# Patient Record
Sex: MALE | Race: WHITE | URBAN - METROPOLITAN AREA
[De-identification: names, ages, dates, MRNs, and addresses within clinical notes are randomized per-mention and may not be internally consistent; named-entity substitution may affect disease eponyms.]

---

## 2018-10-04 ENCOUNTER — OFFICE VISIT (OUTPATIENT)
Dept: PRIMARY CARE CLINIC | Age: 53
End: 2018-10-04
Payer: COMMERCIAL

## 2018-10-04 VITALS
BODY MASS INDEX: 28.56 KG/M2 | WEIGHT: 204 LBS | TEMPERATURE: 97.9 F | HEIGHT: 71 IN | SYSTOLIC BLOOD PRESSURE: 108 MMHG | DIASTOLIC BLOOD PRESSURE: 80 MMHG | OXYGEN SATURATION: 95 % | HEART RATE: 65 BPM

## 2018-10-04 DIAGNOSIS — L82.1 SEBORRHEIC KERATOSES: ICD-10-CM

## 2018-10-04 DIAGNOSIS — L85.9 HYPERKERATOSIS OF SKIN: Primary | ICD-10-CM

## 2018-10-04 DIAGNOSIS — R06.02 SHORTNESS OF BREATH: ICD-10-CM

## 2018-10-04 DIAGNOSIS — Z80.8 FAMILY HISTORY OF SKIN CANCER: ICD-10-CM

## 2018-10-04 DIAGNOSIS — M77.02 MEDIAL EPICONDYLITIS OF LEFT ELBOW: ICD-10-CM

## 2018-10-04 DIAGNOSIS — L72.0 EIC (EPIDERMAL INCLUSION CYST): ICD-10-CM

## 2018-10-04 DIAGNOSIS — Z12.11 SCREEN FOR COLON CANCER: ICD-10-CM

## 2018-10-04 DIAGNOSIS — J44.9 CHRONIC OBSTRUCTIVE PULMONARY DISEASE, UNSPECIFIED COPD TYPE (HCC): ICD-10-CM

## 2018-10-04 PROCEDURE — 17000 DESTRUCT PREMALG LESION: CPT | Performed by: FAMILY MEDICINE

## 2018-10-04 PROCEDURE — 17003 DESTRUCT PREMALG LES 2-14: CPT | Performed by: FAMILY MEDICINE

## 2018-10-04 PROCEDURE — 99213 OFFICE O/P EST LOW 20 MIN: CPT | Performed by: FAMILY MEDICINE

## 2018-10-04 RX ORDER — PREDNISONE 10 MG/1
TABLET ORAL
Qty: 20 TABLET | Refills: 0 | Status: SHIPPED | OUTPATIENT
Start: 2018-10-04 | End: 2018-10-14

## 2018-10-04 RX ORDER — ALBUTEROL SULFATE 90 UG/1
2 AEROSOL, METERED RESPIRATORY (INHALATION) EVERY 6 HOURS PRN
Qty: 1 INHALER | Refills: 3 | Status: ON HOLD | OUTPATIENT
Start: 2018-10-04 | End: 2020-05-11

## 2018-10-04 ASSESSMENT — PATIENT HEALTH QUESTIONNAIRE - PHQ9
SUM OF ALL RESPONSES TO PHQ9 QUESTIONS 1 & 2: 0
SUM OF ALL RESPONSES TO PHQ QUESTIONS 1-9: 0
2. FEELING DOWN, DEPRESSED OR HOPELESS: 0
1. LITTLE INTEREST OR PLEASURE IN DOING THINGS: 0
SUM OF ALL RESPONSES TO PHQ QUESTIONS 1-9: 0

## 2018-10-04 ASSESSMENT — ENCOUNTER SYMPTOMS
CHEST TIGHTNESS: 0
VOMITING: 0
CHOKING: 0
EYE PAIN: 0
SPUTUM PRODUCTION: 0
PHOTOPHOBIA: 0
FACIAL SWELLING: 0
NAUSEA: 0
EYE DISCHARGE: 0
CONSTIPATION: 0
SWOLLEN GLANDS: 0
HEMOPTYSIS: 0
ABDOMINAL PAIN: 0
RHINORRHEA: 0
COLOR CHANGE: 0
WHEEZING: 0
SORE THROAT: 0
EYE REDNESS: 0
COUGH: 0
APNEA: 0
STRIDOR: 0
ORTHOPNEA: 0
DIARRHEA: 0
NAIL CHANGES: 0
SHORTNESS OF BREATH: 1

## 2018-10-04 NOTE — PROGRESS NOTES
ANA Iglesias   in my presence, and it is both accurate and complete.  Electronically signed by: Aldo Rider DO    10/4/18 8:46 AM    Aldo Rider DO

## 2018-10-15 ENCOUNTER — OFFICE VISIT (OUTPATIENT)
Dept: PRIMARY CARE CLINIC | Age: 53
End: 2018-10-15
Payer: COMMERCIAL

## 2018-10-15 VITALS
BODY MASS INDEX: 28.84 KG/M2 | HEART RATE: 61 BPM | WEIGHT: 206 LBS | TEMPERATURE: 98.1 F | RESPIRATION RATE: 14 BRPM | HEIGHT: 71 IN | SYSTOLIC BLOOD PRESSURE: 112 MMHG | DIASTOLIC BLOOD PRESSURE: 78 MMHG

## 2018-10-15 DIAGNOSIS — R22.9 SINGLE SKIN NODULE: ICD-10-CM

## 2018-10-15 DIAGNOSIS — R52 LOCALIZED PAIN: ICD-10-CM

## 2018-10-15 DIAGNOSIS — R22.9 SINGLE SKIN NODULE: Primary | ICD-10-CM

## 2018-10-15 DIAGNOSIS — D23.9 DERMATOFIBROMA: ICD-10-CM

## 2018-10-15 PROCEDURE — 99212 OFFICE O/P EST SF 10 MIN: CPT | Performed by: FAMILY MEDICINE

## 2018-10-15 PROCEDURE — 11100 PR BIOPSY OF SKIN LESION: CPT | Performed by: FAMILY MEDICINE

## 2018-10-15 ASSESSMENT — ENCOUNTER SYMPTOMS
CHEST TIGHTNESS: 0
STRIDOR: 0
PHOTOPHOBIA: 0
EYE DISCHARGE: 0
CONSTIPATION: 0
EYE REDNESS: 0
FACIAL SWELLING: 0
NAUSEA: 0
COLOR CHANGE: 0
CHOKING: 0
WHEEZING: 0
APNEA: 0
EYE PAIN: 0
ABDOMINAL PAIN: 0
DIARRHEA: 0

## 2018-10-15 NOTE — PROGRESS NOTES
Musculoskeletal: Negative for gait problem, joint swelling, neck pain and neck stiffness. Skin: Negative for color change, pallor, rash and wound. Allergic/Immunologic: Negative for immunocompromised state. Neurological: Negative for tremors, syncope, facial asymmetry, speech difficulty and headaches. Psychiatric/Behavioral: Negative for confusion and hallucinations. The patient is not nervous/anxious and is not hyperactive. Objective:   /78 (Site: Right Upper Arm, Position: Sitting, Cuff Size: Medium Adult)   Pulse 61   Temp 98.1 °F (36.7 °C)   Resp 14   Ht 5' 11\" (1.803 m)   Wt 206 lb (93.4 kg)   BMI 28.73 kg/m²     Physical Exam   Constitutional: He is oriented to person, place, and time. He appears well-developed and well-nourished. HENT:   Head: Normocephalic and atraumatic. Right Ear: External ear normal.   Left Ear: External ear normal.   Eyes: Pupils are equal, round, and reactive to light. Conjunctivae and EOM are normal.   Neck: Normal range of motion. Neck supple. Neurological: He is alert and oriented to person, place, and time. Skin: Skin is warm and dry. Psychiatric: He has a normal mood and affect. His behavior is normal. Judgment and thought content normal.       Assessment:      Diagnosis Orders   1. Single skin nodule  Surgical Pathology    ID BIOPSY OF SKIN LESION   2. Localized pain  Surgical Pathology    ID BIOPSY OF SKIN LESION   3.  Probable Dermatofibroma  Surgical Pathology    ID BIOPSY OF SKIN LESION       Plan:      Orders Placed This Encounter   Procedures    Surgical Pathology     Standing Status:   Future     Standing Expiration Date:   10/15/2019     Order Specific Question:   PREVIOUS BIOPSY     Answer:   No     Order Specific Question:   PREOP DIAGNOSIS     Answer:   n/a     Order Specific Question:   FROZEN SECTION - NO OR YES/SPECIMEN     Answer:   No    ID BIOPSY OF SKIN LESION     No orders of the defined types were placed in this

## 2018-10-20 ENCOUNTER — OFFICE VISIT (OUTPATIENT)
Dept: FAMILY MEDICINE CLINIC | Age: 53
End: 2018-10-20
Payer: COMMERCIAL

## 2018-10-20 VITALS
DIASTOLIC BLOOD PRESSURE: 70 MMHG | SYSTOLIC BLOOD PRESSURE: 116 MMHG | BODY MASS INDEX: 28.98 KG/M2 | RESPIRATION RATE: 16 BRPM | TEMPERATURE: 97.9 F | HEIGHT: 71 IN | WEIGHT: 207 LBS

## 2018-10-20 DIAGNOSIS — L23.7 ALLERGIC CONTACT DERMATITIS DUE TO PLANTS, EXCEPT FOOD: Primary | ICD-10-CM

## 2018-10-20 PROCEDURE — 96372 THER/PROPH/DIAG INJ SC/IM: CPT | Performed by: NURSE PRACTITIONER

## 2018-10-20 PROCEDURE — 99212 OFFICE O/P EST SF 10 MIN: CPT | Performed by: NURSE PRACTITIONER

## 2018-10-20 RX ORDER — METHYLPREDNISOLONE ACETATE 40 MG/ML
40 INJECTION, SUSPENSION INTRA-ARTICULAR; INTRALESIONAL; INTRAMUSCULAR; SOFT TISSUE ONCE
Status: COMPLETED | OUTPATIENT
Start: 2018-10-20 | End: 2018-10-20

## 2018-10-20 RX ORDER — METHYLPREDNISOLONE 4 MG/1
TABLET ORAL
Qty: 1 KIT | Refills: 0 | Status: SHIPPED | OUTPATIENT
Start: 2018-10-20 | End: 2018-10-26

## 2018-10-20 RX ADMIN — METHYLPREDNISOLONE ACETATE 40 MG: 40 INJECTION, SUSPENSION INTRA-ARTICULAR; INTRALESIONAL; INTRAMUSCULAR; SOFT TISSUE at 12:40

## 2018-10-20 ASSESSMENT — ENCOUNTER SYMPTOMS
SORE THROAT: 0
GASTROINTESTINAL NEGATIVE: 1
WHEEZING: 0
TROUBLE SWALLOWING: 0
FACIAL SWELLING: 0
COLOR CHANGE: 0
EYE ITCHING: 0
SHORTNESS OF BREATH: 0
EYE DISCHARGE: 0
SINUS PRESSURE: 0
APNEA: 0
BACK PAIN: 0
COUGH: 0
STRIDOR: 0
CHOKING: 0
CHEST TIGHTNESS: 0

## 2018-10-20 NOTE — PROGRESS NOTES
cough, choking, chest tightness, shortness of breath, wheezing and stridor. Cardiovascular: Negative for chest pain, palpitations and leg swelling. Gastrointestinal: Negative. Endocrine: Negative for cold intolerance, heat intolerance, polydipsia and polyuria. Genitourinary: Negative for dysuria, flank pain, frequency, hematuria and urgency. Musculoskeletal: Negative for arthralgias, back pain, gait problem, joint swelling and myalgias. Skin: Positive for rash. Negative for color change. Neurological: Negative for dizziness, tremors, seizures, syncope, speech difficulty, weakness, numbness and headaches. Psychiatric/Behavioral: Negative for agitation, behavioral problems, confusion, decreased concentration, dysphoric mood and hallucinations. Objective:   /70   Temp 97.9 °F (36.6 °C)   Resp 16   Ht 5' 11\" (1.803 m)   Wt 207 lb (93.9 kg)   BMI 28.87 kg/m²     Physical Exam   Constitutional: He is oriented to person, place, and time. He appears well-developed. HENT:   Head: Normocephalic and atraumatic. Mouth/Throat: Oropharynx is clear and moist.   Eyes: Pupils are equal, round, and reactive to light. Neck: No tracheal deviation present. No thyromegaly present. Cardiovascular: Normal rate, regular rhythm and normal heart sounds. Exam reveals no gallop. No murmur heard. Pulmonary/Chest: Effort normal and breath sounds normal. He has no wheezes. He has no rales. He exhibits no tenderness. Abdominal: Soft. Bowel sounds are normal. He exhibits no distension and no mass. There is no tenderness. There is no rebound and no guarding. Musculoskeletal: Normal range of motion. He exhibits no edema or tenderness. Lymphadenopathy:     He has no cervical adenopathy. Neurological: He is alert and oriented to person, place, and time. Coordination normal.   Skin: Skin is warm and dry. Rash noted.  No abrasion, no bruising, no burn, no ecchymosis, no laceration and no petechiae

## 2018-10-26 ENCOUNTER — TELEPHONE (OUTPATIENT)
Dept: PRIMARY CARE CLINIC | Age: 53
End: 2018-10-26

## 2018-10-26 ENCOUNTER — OFFICE VISIT (OUTPATIENT)
Dept: FAMILY MEDICINE CLINIC | Age: 53
End: 2018-10-26
Payer: COMMERCIAL

## 2018-10-26 VITALS
DIASTOLIC BLOOD PRESSURE: 78 MMHG | WEIGHT: 207 LBS | HEART RATE: 67 BPM | HEIGHT: 71 IN | TEMPERATURE: 97.2 F | BODY MASS INDEX: 28.98 KG/M2 | OXYGEN SATURATION: 98 % | RESPIRATION RATE: 14 BRPM | SYSTOLIC BLOOD PRESSURE: 124 MMHG

## 2018-10-26 DIAGNOSIS — L24.7 CONTACT DERMATITIS AND ECZEMA DUE TO PLANT: Primary | ICD-10-CM

## 2018-10-26 PROCEDURE — 96372 THER/PROPH/DIAG INJ SC/IM: CPT | Performed by: NURSE PRACTITIONER

## 2018-10-26 PROCEDURE — 99213 OFFICE O/P EST LOW 20 MIN: CPT | Performed by: NURSE PRACTITIONER

## 2018-10-26 RX ORDER — METHYLPREDNISOLONE ACETATE 80 MG/ML
80 INJECTION, SUSPENSION INTRA-ARTICULAR; INTRALESIONAL; INTRAMUSCULAR; SOFT TISSUE ONCE
Status: COMPLETED | OUTPATIENT
Start: 2018-10-26 | End: 2018-10-26

## 2018-10-26 RX ORDER — BETAMETHASONE DIPROPIONATE 0.5 MG/G
CREAM TOPICAL
Qty: 1 TUBE | Refills: 0 | Status: SHIPPED | OUTPATIENT
Start: 2018-10-26 | End: 2018-11-09

## 2018-10-26 RX ORDER — CETIRIZINE HYDROCHLORIDE 10 MG/1
10 TABLET ORAL 2 TIMES DAILY
Qty: 10 TABLET | Refills: 0 | COMMUNITY
Start: 2018-10-26 | End: 2018-10-31

## 2018-10-26 RX ORDER — PREDNISONE 10 MG/1
TABLET ORAL
Qty: 20 TABLET | Refills: 0 | Status: SHIPPED | OUTPATIENT
Start: 2018-10-26 | End: 2018-11-08

## 2018-10-26 RX ORDER — RANITIDINE 150 MG/1
150 CAPSULE ORAL DAILY
Qty: 5 CAPSULE | Refills: 0 | COMMUNITY
Start: 2018-10-26 | End: 2019-02-08

## 2018-10-26 RX ADMIN — METHYLPREDNISOLONE ACETATE 80 MG: 80 INJECTION, SUSPENSION INTRA-ARTICULAR; INTRALESIONAL; INTRAMUSCULAR; SOFT TISSUE at 14:29

## 2018-10-26 ASSESSMENT — ENCOUNTER SYMPTOMS
FACIAL SWELLING: 0
EYE REDNESS: 0
EYE ITCHING: 0
TROUBLE SWALLOWING: 0
PHOTOPHOBIA: 0
EYE DISCHARGE: 0
SHORTNESS OF BREATH: 0
VOMITING: 0
SORE THROAT: 0
COUGH: 0
DIARRHEA: 0
EYE PAIN: 0
NAUSEA: 0
CHEST TIGHTNESS: 0
RHINORRHEA: 0

## 2018-10-26 NOTE — LETTER
[address]  [facility], [address]  [address] [place] [address]  Phone: [phone]  Fax: 226 HUONG Mckeon NP        October 26, 2018     Patient: William Rossi   YOB: 1965   Date of Visit: 10/26/2018       To Whom it May Concern:    Richelle Calderon was seen in my clinic on 10/26/2018. He may return to work on 10/28/18. If you have any questions or concerns, please don't hesitate to call.     Sincerely,         HUONG Moore NP

## 2018-10-26 NOTE — PATIENT INSTRUCTIONS
Oral Steroid Instructions: Take each dose with a small snack or meal to lessen potential GI upset. Follow dosing instructions provided with prescription. Common side effects include difficulty sleeping and irritability. Take full course as ordered.

## 2018-10-30 NOTE — TELEPHONE ENCOUNTER
Pt is aware. He is requesting to schedule a Saturday appt however, there are no Saturday appts available as of yet. He states there is still an issue with the skin lesion, it is raised and sore. He is concerned with additional costs of care needing to be seen again. Please Advise.

## 2018-11-01 ENCOUNTER — ANESTHESIA EVENT (OUTPATIENT)
Dept: ENDOSCOPY | Age: 53
End: 2018-11-01
Payer: COMMERCIAL

## 2018-11-01 ENCOUNTER — HOSPITAL ENCOUNTER (OUTPATIENT)
Age: 53
Setting detail: OUTPATIENT SURGERY
Discharge: HOME | End: 2018-11-01
Attending: SPECIALIST | Admitting: SPECIALIST
Payer: COMMERCIAL

## 2018-11-01 ENCOUNTER — ANESTHESIA (OUTPATIENT)
Dept: ENDOSCOPY | Age: 53
End: 2018-11-01
Payer: COMMERCIAL

## 2018-11-01 VITALS
SYSTOLIC BLOOD PRESSURE: 121 MMHG | DIASTOLIC BLOOD PRESSURE: 62 MMHG | OXYGEN SATURATION: 97 % | RESPIRATION RATE: 6 BRPM

## 2018-11-01 VITALS
OXYGEN SATURATION: 98 % | DIASTOLIC BLOOD PRESSURE: 81 MMHG | HEART RATE: 51 BPM | RESPIRATION RATE: 16 BRPM | SYSTOLIC BLOOD PRESSURE: 132 MMHG

## 2018-11-01 PROCEDURE — 2580000003 HC RX 258: Performed by: SPECIALIST

## 2018-11-01 PROCEDURE — 3700000000 HC ANESTHESIA ATTENDED CARE: Performed by: SPECIALIST

## 2018-11-01 PROCEDURE — 2500000003 HC RX 250 WO HCPCS: Performed by: NURSE ANESTHETIST, CERTIFIED REGISTERED

## 2018-11-01 PROCEDURE — 7100000010 HC PHASE II RECOVERY - FIRST 15 MIN: Performed by: SPECIALIST

## 2018-11-01 PROCEDURE — 3609027000 HC COLONOSCOPY: Performed by: SPECIALIST

## 2018-11-01 PROCEDURE — 6360000002 HC RX W HCPCS: Performed by: NURSE ANESTHETIST, CERTIFIED REGISTERED

## 2018-11-01 RX ORDER — SODIUM CHLORIDE 0.9 % (FLUSH) 0.9 %
10 SYRINGE (ML) INJECTION PRN
Status: DISCONTINUED | OUTPATIENT
Start: 2018-11-01 | End: 2018-11-01 | Stop reason: HOSPADM

## 2018-11-01 RX ORDER — LIDOCAINE HYDROCHLORIDE 20 MG/ML
INJECTION, SOLUTION INFILTRATION; PERINEURAL PRN
Status: DISCONTINUED | OUTPATIENT
Start: 2018-11-01 | End: 2018-11-01 | Stop reason: SDUPTHER

## 2018-11-01 RX ORDER — PROPOFOL 10 MG/ML
INJECTION, EMULSION INTRAVENOUS PRN
Status: DISCONTINUED | OUTPATIENT
Start: 2018-11-01 | End: 2018-11-01 | Stop reason: SDUPTHER

## 2018-11-01 RX ORDER — SODIUM CHLORIDE 0.9 % (FLUSH) 0.9 %
10 SYRINGE (ML) INJECTION EVERY 12 HOURS SCHEDULED
Status: DISCONTINUED | OUTPATIENT
Start: 2018-11-01 | End: 2018-11-01 | Stop reason: HOSPADM

## 2018-11-01 RX ORDER — LIDOCAINE HYDROCHLORIDE 10 MG/ML
1 INJECTION, SOLUTION EPIDURAL; INFILTRATION; INTRACAUDAL; PERINEURAL
Status: DISCONTINUED | OUTPATIENT
Start: 2018-11-01 | End: 2018-11-01 | Stop reason: HOSPADM

## 2018-11-01 RX ORDER — SODIUM CHLORIDE 9 MG/ML
INJECTION, SOLUTION INTRAVENOUS CONTINUOUS
Status: DISCONTINUED | OUTPATIENT
Start: 2018-11-01 | End: 2018-11-01 | Stop reason: HOSPADM

## 2018-11-01 RX ADMIN — LIDOCAINE HYDROCHLORIDE 40 MG: 20 INJECTION, SOLUTION INFILTRATION; PERINEURAL at 07:45

## 2018-11-01 RX ADMIN — SODIUM CHLORIDE: 9 INJECTION, SOLUTION INTRAVENOUS at 07:00

## 2018-11-01 RX ADMIN — PROPOFOL 20 MG: 10 INJECTION, EMULSION INTRAVENOUS at 07:49

## 2018-11-01 RX ADMIN — PROPOFOL 20 MG: 10 INJECTION, EMULSION INTRAVENOUS at 07:47

## 2018-11-01 RX ADMIN — PROPOFOL 20 MG: 10 INJECTION, EMULSION INTRAVENOUS at 07:46

## 2018-11-01 RX ADMIN — PROPOFOL 20 MG: 10 INJECTION, EMULSION INTRAVENOUS at 07:53

## 2018-11-01 RX ADMIN — PROPOFOL 20 MG: 10 INJECTION, EMULSION INTRAVENOUS at 07:51

## 2018-11-01 RX ADMIN — PROPOFOL 100 MG: 10 INJECTION, EMULSION INTRAVENOUS at 07:45

## 2018-11-01 ASSESSMENT — ENCOUNTER SYMPTOMS: SHORTNESS OF BREATH: 1

## 2018-11-01 ASSESSMENT — PAIN DESCRIPTION - DESCRIPTORS: DESCRIPTORS: BURNING

## 2018-11-01 ASSESSMENT — PAIN DESCRIPTION - LOCATION: LOCATION: RECTUM

## 2018-11-01 ASSESSMENT — PAIN SCALES - GENERAL: PAINLEVEL_OUTOF10: 3

## 2018-11-01 ASSESSMENT — PAIN DESCRIPTION - PAIN TYPE: TYPE: ACUTE PAIN

## 2018-11-01 NOTE — ANESTHESIA PRE PROCEDURE
RN M77.02    Lumbago M54.5       Past Medical History:        Diagnosis Date    Atypical chest pain, Dr Michelle Pinto, neg GXT, 2014 10/7/2015    [facility] obstructive [facility]     [facility] lung ([facility]     Health maintenance examination 10/7/2015    Left nephrolithiasis 10/7/2015    Lumbago 4/27/2004    Medial epicondylitis of left elbow, Drummer, RN 10/4/2018    Shortness of breath 10/7/2015       Past Surgical History:        Procedure Laterality Date    AMPUTATION Left     ring finger tip    WISDOM TOOTH EXTRACTION      x4       Social History:    Social History   Substance Use Topics    Smoking status: Never Smoker    Smokeless tobacco: Never Used    Alcohol use No                                Counseling given: Not Answered      Vital Signs (Current): There were no vitals filed for this visit.                                            BP Readings from Last 3 Encounters:   10/26/18 124/78   10/20/18 116/70   10/15/18 112/78       NPO Status: Time of last liquid consumption: 2300                        Time of last solid consumption: 1200                        Date of last liquid consumption: 10/31/18                        Date of last solid food consumption: 10/30/18    BMI:   Wt Readings from Last 3 Encounters:   10/26/18 207 lb (93.9 kg)   10/20/18 207 lb (93.9 kg)   10/15/18 206 lb (93.4 kg)     There is no height or weight on file to calculate BMI.    CBC:   Lab Results   Component Value Date    WBC 6.4 09/10/2014    RBC 5.01 09/10/2014    HGB 15.8 09/10/2014    HCT 48.1 09/10/2014    MCV 96.1 09/10/2014    RDW 13.1 09/10/2014     09/10/2014       CMP:   Lab Results   Component Value Date     06/24/2016    K 3.8 06/24/2016     06/24/2016    CO2 27 06/24/2016    BUN 16 06/24/2016    CREATININE 0.65 06/24/2016    GFRAA >60.0 06/24/2016    LABGLOM >60.0 06/24/2016    GLUCOSE 71 06/24/2016    PROT 7.4 06/24/2016    CALCIUM 9.3 06/24/2016    BILITOT 0.6

## 2019-02-08 ENCOUNTER — OFFICE VISIT (OUTPATIENT)
Dept: PRIMARY CARE CLINIC | Age: 54
End: 2019-02-08
Payer: COMMERCIAL

## 2019-02-08 VITALS
OXYGEN SATURATION: 99 % | DIASTOLIC BLOOD PRESSURE: 72 MMHG | TEMPERATURE: 95.8 F | SYSTOLIC BLOOD PRESSURE: 100 MMHG | WEIGHT: 214 LBS | RESPIRATION RATE: 16 BRPM | HEART RATE: 66 BPM | BODY MASS INDEX: 29.96 KG/M2 | HEIGHT: 71 IN

## 2019-02-08 DIAGNOSIS — L82.1 SEBORRHEIC KERATOSIS: Primary | ICD-10-CM

## 2019-02-08 DIAGNOSIS — M77.02 MEDIAL EPICONDYLITIS OF LEFT ELBOW: ICD-10-CM

## 2019-02-08 PROCEDURE — 17000 DESTRUCT PREMALG LESION: CPT | Performed by: FAMILY MEDICINE

## 2019-02-08 PROCEDURE — 20605 DRAIN/INJ JOINT/BURSA W/O US: CPT | Performed by: FAMILY MEDICINE

## 2019-02-08 PROCEDURE — 99213 OFFICE O/P EST LOW 20 MIN: CPT | Performed by: FAMILY MEDICINE

## 2019-02-08 RX ORDER — TRIAMCINOLONE ACETONIDE 40 MG/ML
80 INJECTION, SUSPENSION INTRA-ARTICULAR; INTRAMUSCULAR ONCE
Status: COMPLETED | OUTPATIENT
Start: 2019-02-08 | End: 2019-02-08

## 2019-02-08 RX ADMIN — TRIAMCINOLONE ACETONIDE 80 MG: 40 INJECTION, SUSPENSION INTRA-ARTICULAR; INTRAMUSCULAR at 10:12

## 2019-02-08 ASSESSMENT — ENCOUNTER SYMPTOMS
EYE REDNESS: 0
NAUSEA: 0
FACIAL SWELLING: 0
COLOR CHANGE: 1
STRIDOR: 0
WHEEZING: 0
CHOKING: 0
DIARRHEA: 0
ABDOMINAL PAIN: 0
PHOTOPHOBIA: 0
CONSTIPATION: 0
EYE PAIN: 0
EYE DISCHARGE: 0
CHEST TIGHTNESS: 0
APNEA: 0

## 2019-02-08 ASSESSMENT — PATIENT HEALTH QUESTIONNAIRE - PHQ9
2. FEELING DOWN, DEPRESSED OR HOPELESS: 0
SUM OF ALL RESPONSES TO PHQ9 QUESTIONS 1 & 2: 0
1. LITTLE INTEREST OR PLEASURE IN DOING THINGS: 0
SUM OF ALL RESPONSES TO PHQ QUESTIONS 1-9: 0
SUM OF ALL RESPONSES TO PHQ QUESTIONS 1-9: 0

## 2019-02-18 ENCOUNTER — OFFICE VISIT (OUTPATIENT)
Dept: PRIMARY CARE CLINIC | Age: 54
End: 2019-02-18
Payer: COMMERCIAL

## 2019-02-18 VITALS
BODY MASS INDEX: 29.54 KG/M2 | TEMPERATURE: 97.6 F | WEIGHT: 211 LBS | RESPIRATION RATE: 14 BRPM | OXYGEN SATURATION: 98 % | HEIGHT: 71 IN | HEART RATE: 65 BPM | DIASTOLIC BLOOD PRESSURE: 69 MMHG | SYSTOLIC BLOOD PRESSURE: 100 MMHG

## 2019-02-18 DIAGNOSIS — J02.9 SORE THROAT: Primary | ICD-10-CM

## 2019-02-18 LAB — S PYO AG THROAT QL: NORMAL

## 2019-02-18 PROCEDURE — 87880 STREP A ASSAY W/OPTIC: CPT | Performed by: INTERNAL MEDICINE

## 2019-02-18 PROCEDURE — 99214 OFFICE O/P EST MOD 30 MIN: CPT | Performed by: INTERNAL MEDICINE

## 2019-02-18 RX ORDER — AZITHROMYCIN 250 MG/1
TABLET, FILM COATED ORAL
Qty: 1 PACKET | Refills: 1 | Status: SHIPPED | OUTPATIENT
Start: 2019-02-18 | End: 2019-03-08

## 2019-02-21 ASSESSMENT — ENCOUNTER SYMPTOMS
PHOTOPHOBIA: 0
CHOKING: 0
NAUSEA: 0
VOICE CHANGE: 0
TROUBLE SWALLOWING: 0
ABDOMINAL PAIN: 0
VOMITING: 0
SHORTNESS OF BREATH: 0

## 2019-03-08 ENCOUNTER — OFFICE VISIT (OUTPATIENT)
Dept: INTERNAL MEDICINE CLINIC | Age: 54
End: 2019-03-08
Payer: COMMERCIAL

## 2019-03-08 VITALS
RESPIRATION RATE: 14 BRPM | BODY MASS INDEX: 28.56 KG/M2 | DIASTOLIC BLOOD PRESSURE: 84 MMHG | HEIGHT: 71 IN | TEMPERATURE: 97.3 F | WEIGHT: 204 LBS | SYSTOLIC BLOOD PRESSURE: 120 MMHG

## 2019-03-08 DIAGNOSIS — J20.9 ACUTE BRONCHITIS, UNSPECIFIED ORGANISM: Primary | ICD-10-CM

## 2019-03-08 DIAGNOSIS — J10.1 INFLUENZA A: ICD-10-CM

## 2019-03-08 LAB
INFLUENZA A ANTIBODY: POSITIVE
INFLUENZA B ANTIBODY: NEGATIVE

## 2019-03-08 PROCEDURE — 99213 OFFICE O/P EST LOW 20 MIN: CPT | Performed by: FAMILY MEDICINE

## 2019-03-08 PROCEDURE — 87804 INFLUENZA ASSAY W/OPTIC: CPT | Performed by: FAMILY MEDICINE

## 2019-03-08 RX ORDER — AZITHROMYCIN 250 MG/1
250 TABLET, FILM COATED ORAL SEE ADMIN INSTRUCTIONS
Qty: 6 TABLET | Refills: 0 | Status: SHIPPED | OUTPATIENT
Start: 2019-03-08 | End: 2019-03-13

## 2019-03-08 RX ORDER — OSELTAMIVIR PHOSPHATE 75 MG/1
75 CAPSULE ORAL 2 TIMES DAILY
Qty: 10 CAPSULE | Refills: 0 | Status: SHIPPED | OUTPATIENT
Start: 2019-03-08 | End: 2019-03-13

## 2019-03-08 RX ORDER — METHYLPREDNISOLONE 4 MG/1
TABLET ORAL
Qty: 1 KIT | Refills: 0 | Status: SHIPPED | OUTPATIENT
Start: 2019-03-08 | End: 2019-03-14

## 2019-03-11 ENCOUNTER — OFFICE VISIT (OUTPATIENT)
Dept: INTERNAL MEDICINE CLINIC | Age: 54
End: 2019-03-11
Payer: COMMERCIAL

## 2019-03-11 ENCOUNTER — HOSPITAL ENCOUNTER (OUTPATIENT)
Dept: GENERAL RADIOLOGY | Age: 54
Discharge: HOME | End: 2019-03-13
Payer: COMMERCIAL

## 2019-03-11 VITALS
HEART RATE: 65 BPM | TEMPERATURE: 101.2 F | HEIGHT: 71 IN | SYSTOLIC BLOOD PRESSURE: 124 MMHG | DIASTOLIC BLOOD PRESSURE: 88 MMHG | BODY MASS INDEX: 28.56 KG/M2 | WEIGHT: 204 LBS | OXYGEN SATURATION: 98 %

## 2019-03-11 DIAGNOSIS — J10.1 INFLUENZA A: ICD-10-CM

## 2019-03-11 DIAGNOSIS — R06.02 SHORTNESS OF BREATH: ICD-10-CM

## 2019-03-11 DIAGNOSIS — R06.02 SHORTNESS OF BREATH: Primary | ICD-10-CM

## 2019-03-11 PROCEDURE — 99213 OFFICE O/P EST LOW 20 MIN: CPT | Performed by: FAMILY MEDICINE

## 2019-03-11 PROCEDURE — 71046 X-RAY EXAM CHEST 2 VIEWS: CPT

## 2019-03-12 ENCOUNTER — TELEPHONE (OUTPATIENT)
Dept: PRIMARY CARE CLINIC | Age: 54
End: 2019-03-12

## 2019-03-12 ENCOUNTER — TELEPHONE (OUTPATIENT)
Dept: ENDOCRINOLOGY | Age: 54
End: 2019-03-12

## 2019-04-19 ENCOUNTER — TELEPHONE (OUTPATIENT)
Dept: INTERNAL MEDICINE CLINIC | Age: 54
End: 2019-04-19

## 2019-05-14 ENCOUNTER — OFFICE VISIT (OUTPATIENT)
Dept: FAMILY MEDICINE CLINIC | Age: 54
End: 2019-05-14
Payer: COMMERCIAL

## 2019-05-14 VITALS
TEMPERATURE: 97.7 F | WEIGHT: 211 LBS | HEIGHT: 71 IN | SYSTOLIC BLOOD PRESSURE: 122 MMHG | OXYGEN SATURATION: 98 % | BODY MASS INDEX: 29.54 KG/M2 | DIASTOLIC BLOOD PRESSURE: 80 MMHG | HEART RATE: 67 BPM

## 2019-05-14 DIAGNOSIS — L23.7 POISON IVY: Primary | ICD-10-CM

## 2019-05-14 PROCEDURE — 99213 OFFICE O/P EST LOW 20 MIN: CPT | Performed by: NURSE PRACTITIONER

## 2019-05-14 RX ORDER — TRIAMCINOLONE ACETONIDE 40 MG/ML
40 INJECTION, SUSPENSION INTRA-ARTICULAR; INTRAMUSCULAR ONCE
Status: DISCONTINUED | OUTPATIENT
Start: 2019-05-14 | End: 2022-02-01

## 2019-05-14 RX ORDER — TRIAMCINOLONE ACETONIDE 40 MG/ML
80 INJECTION, SUSPENSION INTRA-ARTICULAR; INTRAMUSCULAR ONCE
Status: DISCONTINUED | OUTPATIENT
Start: 2019-05-14 | End: 2020-12-14

## 2019-05-14 RX ORDER — PREDNISONE 10 MG/1
TABLET ORAL
Qty: 40 TABLET | Refills: 0 | Status: SHIPPED | OUTPATIENT
Start: 2019-05-14 | End: 2019-05-23 | Stop reason: ALTCHOICE

## 2019-05-14 NOTE — PATIENT INSTRUCTIONS
Patient Education        Poison [place], [place], and [place]: Care Instructions  Your Care Instructions    Poison ivy, poison oak, and poison sumac are plants that can cause a skin rash upon contact. The red, itchy rash often shows up in lines or streaks and may cause fluid-filled blisters or large, raised hives. The rash is caused by an allergic reaction to an oil in poison ivy, oak, and sumac. The rash may occur when you touch the plant or when you touch clothing, pet fur, sporting gear, gardening tools, or other objects that have come in contact with one of these plants. You cannot catch or spread the rash, even if you touch it or the blister fluid, because the plant oil will already have been absorbed or washed off the skin. The rash may seem to be spreading, but either it is still developing from earlier contact or you have touched something that still has the plant oil on it. Follow-up care is a key part of your treatment and safety. Be sure to make and go to all appointments, and call your doctor if you are having problems. It's also a good idea to know your test results and keep a list of the medicines you take. How can you care for yourself at home? · If your doctor prescribed a cream, use it as directed. If your doctor prescribed medicine, take it exactly as prescribed. Call your doctor if you think you are having a problem with your medicine. · Use cold, wet cloths to reduce itching. · Keep cool, and stay out of the sun. · Leave the rash open to the air. · Wash all clothing or other things that may have come in contact with the plant oil. · Avoid most lotions and ointments until the rash heals. Calamine lotion may help relieve symptoms of a plant rash. Use it 3 or 4 times a day. To prevent poison ivy exposure  If you know that you will be near poison ivy, oak, or sumac, you can try these options:  · Use a product designed to help prevent plant oil from getting on the skin.  These products, such as Ivy X Pre-Contact Skin Solution, come in lotions, sprays, or towelettes. You put the product on your skin right before you go outdoors. · If you did not use a preventive product and you have had contact with plant oil, clean it off your skin as soon as possible. Use a product such as [employer]ec[employer] Original Outdoor Skin Cleanser. These products can also be used to clean plant oil from clothing or tools. When should you call for help? Call your doctor now or seek immediate medical care if:    · Your rash gets worse, and you start to feel bad and have a fever, a stiff neck, nausea, and vomiting.     · You have signs of infection, such as:  ? Increased pain, swelling, warmth, or redness. ? Red streaks leading from the rash. ? Pus draining from the rash. ? A fever.    Watch closely for changes in your health, and be sure to contact your doctor if:    · You have new blisters or bruises, or the rash spreads and looks like a sunburn.     · The rash gets worse, or it comes back after nearly disappearing.     · You think a medicine you are using is making your rash worse.     · Your rash does not clear up after 1 to 2 weeks of home treatment.     · You have joint aches or body aches with your rash. Where can you learn more? Go to https://iCatapult.[employer]. org and sign in to your [employer] account. Enter T424 in the [facility] box to learn more about \"Poison [place], [place], and [place]: Care Instructions. \"     If you do not have an account, please click on the \"Sign Up Now\" link. Current as of: April 17, 2018  Content Version: 12.0  © 8062-1864 [employer]. Care instructions adapted under license by [facility] [employer] [facility] ([facility]). If you have questions about a medical condition or this instruction, always ask your healthcare professional. [address] any warranty or liability for your use of this information.

## 2019-05-23 ENCOUNTER — OFFICE VISIT (OUTPATIENT)
Dept: FAMILY MEDICINE CLINIC | Age: 54
End: 2019-05-23
Payer: COMMERCIAL

## 2019-05-23 VITALS
BODY MASS INDEX: 29.89 KG/M2 | HEART RATE: 74 BPM | TEMPERATURE: 97.7 F | DIASTOLIC BLOOD PRESSURE: 74 MMHG | OXYGEN SATURATION: 98 % | RESPIRATION RATE: 12 BRPM | HEIGHT: 70 IN | WEIGHT: 208.8 LBS | SYSTOLIC BLOOD PRESSURE: 104 MMHG

## 2019-05-23 DIAGNOSIS — R21 RASH: Primary | ICD-10-CM

## 2019-05-23 DIAGNOSIS — R07.9 CHEST PAIN, UNSPECIFIED TYPE: ICD-10-CM

## 2019-05-23 DIAGNOSIS — R21 RASH: ICD-10-CM

## 2019-05-23 LAB
ANION GAP SERPL CALCULATED.3IONS-SCNC: 14 MEQ/L (ref 9–15)
BASOPHILS ABSOLUTE: 0 K/UL (ref 0–0.2)
BASOPHILS RELATIVE PERCENT: 0.3 %
BUN BLDV-MCNC: 19 MG/DL (ref 6–20)
CALCIUM SERPL-MCNC: 9.4 MG/DL (ref 8.5–9.9)
CHLORIDE BLD-SCNC: 103 MEQ/L (ref 95–107)
CHOLESTEROL, TOTAL: 188 MG/DL (ref 0–199)
CO2: 27 MEQ/L (ref 20–31)
CREAT SERPL-MCNC: 0.82 MG/DL (ref 0.7–1.2)
EOSINOPHILS ABSOLUTE: 0.1 K/UL (ref 0–0.7)
EOSINOPHILS RELATIVE PERCENT: 0.9 %
GFR AFRICAN AMERICAN: >60
GFR NON-AFRICAN AMERICAN: >60
GLUCOSE BLD-MCNC: 93 MG/DL (ref 70–99)
HCT VFR BLD CALC: 47 % (ref 42–52)
HDLC SERPL-MCNC: 35 MG/DL (ref 40–59)
HEMOGLOBIN: 16.3 G/DL (ref 14–18)
LDL CHOLESTEROL CALCULATED: 113 MG/DL (ref 0–129)
LYMPHOCYTES ABSOLUTE: 1.1 K/UL (ref 1–4.8)
LYMPHOCYTES RELATIVE PERCENT: 19.8 %
MCH RBC QN AUTO: 33.4 PG (ref 27–31.3)
MCHC RBC AUTO-ENTMCNC: 34.6 % (ref 33–37)
MCV RBC AUTO: 96.4 FL (ref 80–100)
MONOCYTES ABSOLUTE: 0.5 K/UL (ref 0.2–0.8)
MONOCYTES RELATIVE PERCENT: 8.6 %
NEUTROPHILS ABSOLUTE: 3.8 K/UL (ref 1.4–6.5)
NEUTROPHILS RELATIVE PERCENT: 70.4 %
PDW BLD-RTO: 13.7 % (ref 11.5–14.5)
PLATELET # BLD: 132 K/UL (ref 130–400)
POTASSIUM SERPL-SCNC: 4 MEQ/L (ref 3.4–4.9)
RBC # BLD: 4.88 M/UL (ref 4.7–6.1)
SODIUM BLD-SCNC: 144 MEQ/L (ref 135–144)
TRIGL SERPL-MCNC: 198 MG/DL (ref 0–150)
WBC # BLD: 5.4 K/UL (ref 4.8–10.8)

## 2019-05-23 PROCEDURE — 99204 OFFICE O/P NEW MOD 45 MIN: CPT | Performed by: INTERNAL MEDICINE

## 2019-05-23 ASSESSMENT — ENCOUNTER SYMPTOMS
EYE ITCHING: 0
EYE DISCHARGE: 0
SORE THROAT: 0
TROUBLE SWALLOWING: 0
EYE REDNESS: 0
RHINORRHEA: 0
APNEA: 0
FACIAL SWELLING: 0
SINUS PRESSURE: 0
VOICE CHANGE: 0
BACK PAIN: 0
EYE PAIN: 0
DIARRHEA: 0
WHEEZING: 0
CONSTIPATION: 0
COLOR CHANGE: 0
CHEST TIGHTNESS: 0
SINUS PAIN: 0
ABDOMINAL DISTENTION: 0
BLOOD IN STOOL: 0
NAUSEA: 0
SHORTNESS OF BREATH: 0
PHOTOPHOBIA: 0
COUGH: 0
VOMITING: 0
ABDOMINAL PAIN: 0
RECTAL PAIN: 0

## 2019-05-23 NOTE — PROGRESS NOTES
Subjective:      Patient ID: Kelsea Ludwig is a 48 y.o. male who presents today for:  Chief Complaint   Patient presents with    Annual Exam     Pt presents here for a physical and Blood work.  Establish Care     Pt is establishing care, Last Pcp - . Pt states that he has skin lesions that are appearing and would like to discuss treatmeant, Pt states that his family has a Hx of skin cancer.  Chest Pain     Pt is complaining of chest pain not due to cardiac issue just pain on the left side medial stinal of his chest.        HPI  72-year-old male with a history of COPD presents to establish continuity with me as his new primary care doctor Mr. Pooja Santos states that he is presently applying topical steroids as treatment for poison ivy. The rash associated with this is improving. He denies fevers chills weight loss nausea night sweats or distal constitutional symptoms . Voices concern over several small skin lesions that are on his back and chest and arms. These lesions were previously evaluated by his primary care doctor and have not in the past been demonstrated to be malignant. He also reports left precordial chest pain that is not associated with exertion shortness of breath, palpitations, nausea, diaphoresis wheezing, cough or additional cardiopulmonary symptoms. He reports having a negative stress test last year. He does not encase and nicotine abuse. He does not have a history  of premature cardiovascular disease.         Past Medical History:   Diagnosis Date    Atypical chest pain, Dr aSmaria Mistry, neg GXT, 2014 10/7/2015    [facility]     Emphysema of lung ([facility]     Health maintenance examination 10/7/2015    Left nephrolithiasis 10/7/2015    Lumbago 4/27/2004    Medial epicondylitis of left elbow, , RN 10/4/2018    Shortness of breath 10/7/2015     Past Surgical History:   Procedure Laterality Date    AMPUTATION Left     ring finger tip    OR COLON CA SCRN NOT HI RSK IND N/A 11/1/2018    COLONOSCOPY performed by Enrique Osman MD at [facility] EXTRACTION      x4     Social History     Socioeconomic History    Marital status: Single     Spouse name: Not on file    Number of children: Not on file    Years of education: Not on file    Highest education level: Not on file   Occupational History    Not on file   Social Needs    Financial resource strain: Not on file    Food insecurity:     Worry: Not on file     Inability: Not on file    Transportation needs:     Medical: Not on file     Non-medical: Not on file   Tobacco Use    Smoking status: Never Smoker    Smokeless tobacco: Never Used   Substance and Sexual Activity    Alcohol use: No    Drug use: No    Sexual activity: Never   Lifestyle    Physical activity:     Days per week: Not on file     Minutes per session: Not on file    Stress: Not on file   Relationships    Social connections:     Talks on phone: Not on file     Gets together: Not on file     Attends Congregational service: Not on file     Active member of club or organization: Not on file     Attends meetings of clubs or organizations: Not on file     Relationship status: Not on file    Intimate partner violence:     Fear of current or ex partner: Not on file     Emotionally abused: Not on file     Physically abused: Not on file     Forced sexual activity: Not on file   Other Topics Concern    Not on file   Social History Narrative    Not on file     Family History   Problem Relation Age of Onset    Cancer Mother     Heart Disease Mother      Allergies   Allergen Reactions    Bee Venom      Other reaction(s): Unknown     Current Outpatient Medications on File Prior to Visit   Medication Sig Dispense Refill    albuterol sulfate HFA (PROAIR HFA) 108 (90 Base) MCG/ACT inhaler Inhale 2 puffs into the lungs every 6 hours as needed for Wheezing 1 Inhaler 3     Current Facility-Administered Medications on File Prior to Visit   Medication Dose Route Frequency Provider Last Rate Last Dose    triamcinolone acetonide (KENALOG-40) injection 80 mg  80 mg Intramuscular Once [employer], APRN - CNP        triamcinolone acetonide (KENALOG-40) injection 40 mg  40 mg Intramuscular Once [employer], APRN - CNP                Review of Systems   Constitutional: Negative for chills, diaphoresis, fatigue and fever. HENT: Negative for congestion, dental problem, drooling, ear discharge, ear pain, facial swelling, hearing loss, mouth sores, nosebleeds, postnasal drip, rhinorrhea, sinus pressure, sinus pain, sneezing, sore throat, tinnitus, trouble swallowing and voice change. Eyes: Negative for photophobia, pain, discharge, redness, itching and visual disturbance. Respiratory: Negative for apnea, cough, chest tightness, shortness of breath and wheezing. Cardiovascular: Negative for chest pain, palpitations and leg swelling. Gastrointestinal: Negative for abdominal distention, abdominal pain, blood in stool, constipation, diarrhea, nausea, rectal pain and vomiting. Endocrine: Negative for cold intolerance, heat intolerance, polydipsia, polyphagia and polyuria. Genitourinary: Negative for decreased urine volume, difficulty urinating, dysuria, flank pain, frequency, genital sores, hematuria and urgency. Musculoskeletal: Negative for arthralgias, back pain, gait problem, joint swelling, myalgias, neck pain and neck stiffness. Skin: Negative for color change, rash and wound. Allergic/Immunologic: Negative for environmental allergies and food allergies. Neurological: Negative for dizziness, tremors, seizures, syncope, facial asymmetry, speech difficulty, weakness, light-headedness, numbness and headaches. Hematological: Negative for adenopathy. Does not bruise/bleed easily.    Psychiatric/Behavioral: Negative for agitation, confusion, decreased concentration, Question:   Is Patient Fasting?/# of Hours     Answer:   yes    Ambulatory referral to Dermatology     Referral Priority:   Routine     Referral Type:   Eval and Treat     Referral Reason:   Specialty Services Required     Referred to Provider:   Tea Ferrera MD     Number of Visits Requested:   1    Ambulatory referral to Cardiology     Referral Priority:   Routine     Referral Type:   Eval and Treat     Referral Reason:   Specialty Services Required     Referred to Provider:   Chaparro Knapp DO     Requested Specialty:   Cardiology     Number of Visits Requested:   1     No orders of the defined types were placed in this encounter. Return in about 3 months (around 8/23/2019).       Osiel Larson MD

## 2019-05-28 ASSESSMENT — ENCOUNTER SYMPTOMS
EYE PAIN: 0
RHINORRHEA: 0
SORE THROAT: 0
DIARRHEA: 0
COUGH: 0
VOMITING: 0
SHORTNESS OF BREATH: 0
NAIL CHANGES: 0

## 2019-05-29 NOTE — PROGRESS NOTES
Subjective  Junious Uli, 48 y.o. male presents today with:  Chief Complaint   Patient presents with    Dermatitis     both eyes and left side of cheek, has been itchy since yesterday        Rash   This is a new problem. The current episode started 1 to 4 weeks ago. The problem has been gradually worsening since onset. The rash is diffuse. The rash is characterized by itchiness, draining, redness and swelling. He was exposed to plant contact. Associated symptoms include facial edema. Pertinent negatives include no anorexia, congestion, cough, diarrhea, eye pain, fatigue, fever, joint pain, nail changes, rhinorrhea, shortness of breath, sore throat or vomiting. Past treatments include topical steroids, anti-itch cream and antihistamine. The treatment provided mild relief. Review of Systems   Constitutional: Negative for fatigue and fever. HENT: Negative for congestion, rhinorrhea and sore throat. Eyes: Negative for pain. Respiratory: Negative for cough and shortness of breath. Gastrointestinal: Negative for anorexia, diarrhea and vomiting. Musculoskeletal: Negative for joint pain. Skin: Positive for rash. Negative for nail changes.        Past Medical History:   Diagnosis Date    Atypical chest pain, Dr Pedro Zapata, neg GXT, 2014 10/7/2015    [facility]     [facility]     Health maintenance examination 10/7/2015    Left nephrolithiasis 10/7/2015    Lumbago 4/27/2004    Medial epicondylitis of left elbow, Drummer, RN 10/4/2018    Shortness of breath 10/7/2015     Past Surgical History:   Procedure Laterality Date    AMPUTATION Left     ring finger tip    CT COLON CA SCRN NOT HI RSK IND N/A 11/1/2018    COLONOSCOPY performed by Enrique Osman MD at [address]      x4     Social History     Socioeconomic History    Marital status: Single     Spouse name: Not on file    Number of children: Not on file    Years of education: Not on file    Highest education level: Not on file   Occupational History    Not on file   Social Needs    Financial resource strain: Not on file    Food insecurity:     Worry: Not on file     Inability: Not on file    Transportation needs:     Medical: Not on file     Non-medical: Not on file   Tobacco Use    Smoking status: Never Smoker    Smokeless tobacco: Never Used   Substance and Sexual Activity    Alcohol use: No    Drug use: No    Sexual activity: Never   Lifestyle    Physical activity:     Days per week: Not on file     Minutes per session: Not on file    Stress: Not on file   Relationships    Social connections:     Talks on phone: Not on file     Gets together: Not on file     Attends [employer]an service: Not on file     Active member of club or organization: Not on file     Attends meetings of clubs or organizations: Not on file     Relationship status: Not on file    Intimate partner violence:     Fear of current or ex partner: Not on file     Emotionally abused: Not on file     Physically abused: Not on file     Forced sexual activity: Not on file   Other Topics Concern    Not on file   Social History Narrative    Not on file     Family History   Problem Relation Age of Onset    Cancer Mother     Heart Disease Mother      Allergies   Allergen Reactions    Bee Venom      Other reaction(s): Unknown     Current Outpatient Medications   Medication Sig Dispense Refill    albuterol sulfate HFA (PROAIR HFA) 108 (90 Base) MCG/ACT inhaler Inhale 2 puffs into the lungs every 6 hours as needed for Wheezing 1 Inhaler 3     Current Facility-Administered Medications   Medication Dose Route Frequency Provider Last Rate Last Dose    triamcinolone acetonide (KENALOG-40) injection 80 mg  80 mg Intramuscular Once [employer] APRN - CNP        triamcinolone acetonide (KENALOG-40) injection 40 mg  40 mg Intramuscular Once [employer] APRN - CNP         [facility], Surgical Hx, Family Hx, and Social Hx reviewed and updated. Health Maintenance reviewed. Objective    Vitals:    19 1049   BP: 122/80   Site: Left Upper Arm   Position: Sitting   Cuff Size: Medium Adult   Pulse: 67   Temp: 97.7 °F (36.5 °C)   TempSrc: Oral   SpO2: 98%   Weight: 211 lb (95.7 kg)   Height: 5' 11\" (1.803 m)       Physical Exam   Constitutional: He is oriented to person, place, and time. He appears well-developed and well-nourished. No distress. HENT:   Head: Normocephalic. Right Ear: External ear normal.   Left Ear: External ear normal.   Eyes: Conjunctivae are normal.   Cardiovascular: Normal rate. Pulmonary/Chest: Effort normal.   Abdominal: There is no CVA tenderness. Neurological: He is alert and oriented to person, place, and time. Skin: Skin is warm and dry. Assessment & Plan   Edmundo Puri was seen today for dermatitis. Diagnoses and all orders for this visit:    Poison ivy  -     triamcinolone acetonide (KENALOG-40) injection 80 mg  -     Discontinue: predniSONE (DELTASONE) 10 MG tablet; 4 for 4 days 3 for 4 days 2 for 4 days 1 for 4 day  -     triamcinolone acetonide (KENALOG-40) injection 40 mg      No orders of the defined types were placed in this encounter. Orders Placed This Encounter   Medications    triamcinolone acetonide (KENALOG-40) injection 80 mg    DISCONTD: predniSONE (DELTASONE) 10 MG tablet     Si for 4 days 3 for 4 days 2 for 4 days 1 for 4 day     Dispense:  40 tablet     Refill:  0    triamcinolone acetonide (KENALOG-40) injection 40 mg     There are no discontinued medications. Return if symptoms worsen or fail to improve. Reviewed with the patient: current clinical status, medications, activities and diet. Side effects, adverse effects of the medication prescribed today, as well as treatment plan/ rationale and result expectations have been discussed with the patient who expresses understanding and desires to proceed.     Close follow up to evaluate treatment results and for coordination of care. I have reviewed the patient's medical history in detail and updated the computerized patient record.     Mariel Palencia, HUONG - CNP

## 2019-06-07 ENCOUNTER — OFFICE VISIT (OUTPATIENT)
Dept: CARDIOLOGY CLINIC | Age: 54
End: 2019-06-07
Payer: COMMERCIAL

## 2019-06-07 VITALS
SYSTOLIC BLOOD PRESSURE: 124 MMHG | HEIGHT: 70 IN | DIASTOLIC BLOOD PRESSURE: 70 MMHG | WEIGHT: 209.6 LBS | BODY MASS INDEX: 30.01 KG/M2 | HEART RATE: 61 BPM

## 2019-06-07 DIAGNOSIS — R07.2 PRECORDIAL PAIN: Primary | ICD-10-CM

## 2019-06-07 DIAGNOSIS — R07.89 ATYPICAL CHEST PAIN: ICD-10-CM

## 2019-06-07 DIAGNOSIS — R06.02 SHORTNESS OF BREATH: ICD-10-CM

## 2019-06-07 PROCEDURE — 93000 ELECTROCARDIOGRAM COMPLETE: CPT | Performed by: INTERNAL MEDICINE

## 2019-06-07 PROCEDURE — 99203 OFFICE O/P NEW LOW 30 MIN: CPT | Performed by: INTERNAL MEDICINE

## 2019-06-07 NOTE — PROGRESS NOTES
Chief Complaint   Patient presents with   [facility] Cardiologist     DR Andrew Dhillon Chest Pain       Patient presents for initial medical evaluation. Patient is followed on a regular basis by Dr. Ethan Escobar MD. C/o focal midsternal CP, can pinpoint it with his finger. Hx of negative stress test a few years ago. Not worsened with exertion. No associated SOB, n/v, or diaphoresis. Non radiating. Occurs at rest randomly. Pt denies  dyspnea, dyspnea on exertion, change in exercise capacity, fatigue,  nausea, vomiting, diarrhea, constipation, motor weakness, insomnia, weight loss, syncope, dizziness, lightheadedness, palpitations, PND, orthopnea, or claudication. + hypertriglyceridemia. GF and mother had MI in 73;s. CXR wnl. No hx of DM, HTN . No hx of LHC. No hx of MI, CHF or arrhythmia. EKG with NSR.          Patient Active Problem List   Diagnosis    Shortness of breath    Atypical chest pain, Dr Eloina Adair, neg GXT, 2014    Left nephrolithiasis    Family history of skin cancer    Medial epicondylitis of left elbow, , RN    Lumbago       Past Surgical History:   Procedure Laterality Date    AMPUTATION Left     ring finger tip    [place] COLON CA SCRN NOT HI RSK IND N/A 11/1/2018    COLONOSCOPY performed by Watson Lewis MD at [facility]      x4       Social History     Socioeconomic History    Marital status: Single     Spouse name: Not on file    Number of children: Not on file    Years of education: Not on file    Highest education level: Not on file   Occupational History    Not on file   Social Needs    Financial resource strain: Not on file    Food insecurity:     Worry: Not on file     Inability: Not on file    Transportation needs:     Medical: Not on file     Non-medical: Not on file   Tobacco Use    Smoking status: Never Smoker    Smokeless tobacco: Never Used   Substance and Sexual Activity    Alcohol use: No    Drug use: No   

## 2019-06-25 ENCOUNTER — TELEPHONE (OUTPATIENT)
Dept: FAMILY MEDICINE CLINIC | Age: 54
End: 2019-06-25

## 2019-09-24 ENCOUNTER — TELEPHONE (OUTPATIENT)
Dept: FAMILY MEDICINE CLINIC | Age: 54
End: 2019-09-24

## 2019-09-24 ENCOUNTER — OFFICE VISIT (OUTPATIENT)
Dept: FAMILY MEDICINE CLINIC | Age: 54
End: 2019-09-24
Payer: COMMERCIAL

## 2019-09-24 VITALS
HEART RATE: 76 BPM | TEMPERATURE: 98 F | RESPIRATION RATE: 16 BRPM | DIASTOLIC BLOOD PRESSURE: 70 MMHG | OXYGEN SATURATION: 96 % | SYSTOLIC BLOOD PRESSURE: 110 MMHG | HEIGHT: 71 IN | WEIGHT: 213 LBS | BODY MASS INDEX: 29.82 KG/M2

## 2019-09-24 DIAGNOSIS — L98.9 SKIN LESIONS: ICD-10-CM

## 2019-09-24 DIAGNOSIS — N52.9 ERECTILE DYSFUNCTION, UNSPECIFIED ERECTILE DYSFUNCTION TYPE: Primary | ICD-10-CM

## 2019-09-24 PROCEDURE — 99214 OFFICE O/P EST MOD 30 MIN: CPT | Performed by: INTERNAL MEDICINE

## 2019-09-24 RX ORDER — SILDENAFIL 50 MG/1
50 TABLET, FILM COATED ORAL PRN
Qty: 30 TABLET | Refills: 3 | Status: SHIPPED | OUTPATIENT
Start: 2019-09-24 | End: 2019-10-03 | Stop reason: SDUPTHER

## 2019-09-24 RX ORDER — TADALAFIL 5 MG/1
5 TABLET ORAL DAILY
Qty: 30 TABLET | Refills: 0 | Status: ON HOLD | OUTPATIENT
Start: 2019-09-24 | End: 2020-04-24 | Stop reason: HOSPADM

## 2019-09-24 ASSESSMENT — ENCOUNTER SYMPTOMS
BLOOD IN STOOL: 0
VOMITING: 0
EYE DISCHARGE: 0
VOICE CHANGE: 0
APNEA: 0
CHEST TIGHTNESS: 0
PHOTOPHOBIA: 0
CONSTIPATION: 0
BACK PAIN: 0
SORE THROAT: 0
SHORTNESS OF BREATH: 0
COLOR CHANGE: 0
RECTAL PAIN: 0
ABDOMINAL DISTENTION: 0
COUGH: 0
WHEEZING: 0
SINUS PAIN: 0
ABDOMINAL PAIN: 0
TROUBLE SWALLOWING: 0
EYE PAIN: 0
NAUSEA: 0
RHINORRHEA: 0
DIARRHEA: 0
SINUS PRESSURE: 0
EYE REDNESS: 0
EYE ITCHING: 0
FACIAL SWELLING: 0

## 2019-09-24 NOTE — PROGRESS NOTES
Subjective:      Patient ID: Feliz Frias is a 48 y.o. male who presents today for:  Chief Complaint   Patient presents with    Lesion(s)     follow uppatient was never seen for his rash because the specialist that he was to see cancelled 3 days prior to his appt pt highly concerned about lesions since skin cancer runs in his family    Discuss Labs       HPI  68-year-old male with no previous medical history presents with 2 complaints. His first complaint is regarding hyperpigmented macules that are noted on his arms. He is concerned regarding the possibility of these lesions representing a malignant melanoma. His second complaint is regarding decreased libido and erectile dysfunction. The symptoms have been present for approximately 6 months. At present he denies polyuria,  Polydipsia, constitutional, sinus, visual,  cardio pulmonary, gastrointestinal, immunological, musculoskeletal, neurologic, hematologic, or psychiatric complaints.                         Past Medical History:   Diagnosis Date    Atypical chest pain, Dr Audrey Mejias, neg GXT, 2014 10/7/2015    [facility] [address]     Emphysema of lung ([facility] [address]     Health maintenance examination 10/7/2015    Left nephrolithiasis 10/7/2015    Lumbago 4/27/2004    Medial epicondylitis of left elbow, Drummer, RN 10/4/2018    Shortness of breath 10/7/2015     Past Surgical History:   Procedure Laterality Date    AMPUTATION Left     ring finger tip    [place] COLON CA SCRN NOT HI RSK IND N/A 11/1/2018    COLONOSCOPY performed by Tc Roldan MD at [facility]      x4     Social History     Socioeconomic History    Marital status: Single     Spouse name: Not on file    Number of children: Not on file    Years of education: Not on file    Highest education level: Not on file   Occupational History    Not on file   Social Needs    Financial resource strain: Not on file   Chelsea-Amado

## 2019-09-25 ENCOUNTER — TELEPHONE (OUTPATIENT)
Dept: FAMILY MEDICINE CLINIC | Age: 54
End: 2019-09-25

## 2019-10-02 NOTE — TELEPHONE ENCOUNTER
Pt called and is upset, he stated that the PA for his viagra got denied and he not sure why. Pt is not aware why this happened an he would like answer.  Please advise

## 2019-10-03 DIAGNOSIS — N52.9 ERECTILE DYSFUNCTION, UNSPECIFIED ERECTILE DYSFUNCTION TYPE: ICD-10-CM

## 2019-10-03 RX ORDER — SILDENAFIL 50 MG/1
100 TABLET, FILM COATED ORAL PRN
Qty: 30 TABLET | Refills: 3 | Status: ON HOLD | OUTPATIENT
Start: 2019-10-03 | End: 2020-05-11

## 2019-10-07 DIAGNOSIS — N52.9 ERECTILE DYSFUNCTION, UNSPECIFIED ERECTILE DYSFUNCTION TYPE: ICD-10-CM

## 2019-10-07 DIAGNOSIS — N52.9 ERECTILE DYSFUNCTION, UNSPECIFIED ERECTILE DYSFUNCTION TYPE: Primary | ICD-10-CM

## 2019-10-07 LAB — TSH SERPL DL<=0.05 MIU/L-ACNC: 2.39 UIU/ML (ref 0.44–3.86)

## 2019-10-08 LAB
SEX HORMONE BINDING GLOBULIN: 20 NMOL/L (ref 11–80)
TESTOSTERONE FREE PERCENT: 2.2 % (ref 1.6–2.9)
TESTOSTERONE FREE, CALC: 53 PG/ML (ref 47–244)
TESTOSTERONE TOTAL-MALE: 237 NG/DL (ref 300–890)

## 2019-10-17 RX ORDER — SILDENAFIL 100 MG/1
100 TABLET, FILM COATED ORAL DAILY PRN
Qty: 60 TABLET | Refills: 1 | Status: ON HOLD | OUTPATIENT
Start: 2019-10-17 | End: 2020-04-24 | Stop reason: HOSPADM

## 2019-12-23 ENCOUNTER — TELEPHONE (OUTPATIENT)
Dept: FAMILY MEDICINE CLINIC | Age: 54
End: 2019-12-23

## 2020-04-20 ENCOUNTER — OFFICE VISIT (OUTPATIENT)
Dept: FAMILY MEDICINE CLINIC | Age: 55
End: 2020-04-20
Payer: COMMERCIAL

## 2020-04-20 PROCEDURE — 99441 PR PHYS/QHP TELEPHONE EVALUATION 5-10 MIN: CPT | Performed by: INTERNAL MEDICINE

## 2020-04-20 ASSESSMENT — ENCOUNTER SYMPTOMS
CHEST TIGHTNESS: 0
TROUBLE SWALLOWING: 0
EYE ITCHING: 0
FACIAL SWELLING: 0
RECTAL PAIN: 0
SINUS PAIN: 0
RHINORRHEA: 0
SHORTNESS OF BREATH: 0
SORE THROAT: 0
EYE REDNESS: 0
EYE DISCHARGE: 0
VOICE CHANGE: 0
COLOR CHANGE: 0
SINUS PRESSURE: 0
NAUSEA: 0
DIARRHEA: 0
WHEEZING: 0
CONSTIPATION: 0
VOMITING: 0
COUGH: 0
ABDOMINAL PAIN: 0
ABDOMINAL DISTENTION: 0
APNEA: 0
BLOOD IN STOOL: 0
BACK PAIN: 0
PHOTOPHOBIA: 0
EYE PAIN: 0

## 2020-04-20 NOTE — PROGRESS NOTES
Not on file   Social Needs    Financial resource strain: Not on file    Food insecurity     Worry: Not on file     Inability: Not on file    Transportation needs     Medical: Not on file     Non-medical: Not on file   Tobacco Use    Smoking status: Never Smoker    Smokeless tobacco: Never Used   Substance and Sexual Activity    Alcohol use: No    Drug use: No    Sexual activity: Never   Lifestyle    Physical activity     Days per week: Not on file     Minutes per session: Not on file    Stress: Not on file   Relationships    Social connections     Talks on phone: Not on file     Gets together: Not on file     Attends Zoroastrian service: Not on file     Active member of club or organization: Not on file     Attends meetings of clubs or organizations: Not on file     Relationship status: Not on file    Intimate partner violence     Fear of current or ex partner: Not on file     Emotionally abused: Not on file     Physically abused: Not on file     Forced sexual activity: Not on file   Other Topics Concern    Not on file   Social History Narrative    Not on file     Family History   Problem Relation Age of Onset    Cancer Mother     Heart Disease Mother      Allergies   Allergen Reactions    Bee Venom      Other reaction(s): Unknown     Current Outpatient Medications on File Prior to Visit   Medication Sig Dispense Refill    sildenafil (VIAGRA) 100 MG tablet Take 1 tablet by mouth daily as needed for Erectile Dysfunction 60 tablet 1    sildenafil (VIAGRA) 50 MG tablet Take 2 tablets by mouth as needed for Erectile Dysfunction 30 tablet 3    tadalafil (CIALIS) 5 MG tablet Take 1 tablet by mouth daily 30 tablet 0    albuterol sulfate HFA (PROAIR HFA) 108 (90 Base) MCG/ACT inhaler Inhale 2 puffs into the lungs every 6 hours as needed for Wheezing 1 Inhaler 3     Current Facility-Administered Medications on File Prior to Visit   Medication Dose Route Frequency Provider Last Rate Last Dose    triamcinolone acetonide (KENALOG-40) injection 80 mg  80 mg Intramuscular Once Paige Gilbert, APRN - CNP        triamcinolone acetonide (KENALOG-40) injection 40 mg  40 mg Intramuscular Once Paige Gilbert, APRN - CNP                Review of Systems   Constitutional: Negative for chills, diaphoresis, fatigue and fever. HENT: Negative for congestion, dental problem, drooling, ear discharge, ear pain, facial swelling, hearing loss, mouth sores, nosebleeds, postnasal drip, rhinorrhea, sinus pressure, sinus pain, sneezing, sore throat, tinnitus, trouble swallowing and voice change. Eyes: Negative for photophobia, pain, discharge, redness, itching and visual disturbance. Respiratory: Negative for apnea, cough, chest tightness, shortness of breath and wheezing. Cardiovascular: Positive for chest pain. Negative for palpitations and leg swelling. Gastrointestinal: Negative for abdominal distention, abdominal pain, blood in stool, constipation, diarrhea, nausea, rectal pain and vomiting. Endocrine: Negative for cold intolerance, heat intolerance, polydipsia, polyphagia and polyuria. Genitourinary: Negative for decreased urine volume, difficulty urinating, dysuria, flank pain, frequency, genital sores, hematuria and urgency. Musculoskeletal: Negative for arthralgias, back pain, gait problem, joint swelling, myalgias, neck pain and neck stiffness. Skin: Negative for color change, rash and wound. Allergic/Immunologic: Negative for environmental allergies and food allergies. Neurological: Negative for dizziness, tremors, seizures, syncope, facial asymmetry, speech difficulty, weakness, light-headedness, numbness and headaches. Hematological: Negative for adenopathy. Does not bruise/bleed easily. Psychiatric/Behavioral: Negative for agitation, confusion, decreased concentration, hallucinations, self-injury, sleep disturbance and suicidal ideas. The patient is not nervous/anxious.

## 2020-04-23 ENCOUNTER — APPOINTMENT (OUTPATIENT)
Dept: GENERAL RADIOLOGY | Age: 55
End: 2020-04-23
Payer: COMMERCIAL

## 2020-04-23 ENCOUNTER — NURSE TRIAGE (OUTPATIENT)
Dept: OTHER | Facility: CLINIC | Age: 55
End: 2020-04-23

## 2020-04-23 ENCOUNTER — HOSPITAL ENCOUNTER (OUTPATIENT)
Age: 55
Setting detail: OBSERVATION
Discharge: HOME | End: 2020-04-24
Attending: STUDENT IN AN ORGANIZED HEALTH CARE EDUCATION/TRAINING PROGRAM | Admitting: INTERNAL MEDICINE
Payer: COMMERCIAL

## 2020-04-23 ENCOUNTER — TELEPHONE (OUTPATIENT)
Dept: INTERNAL MEDICINE CLINIC | Age: 55
End: 2020-04-23

## 2020-04-23 PROBLEM — R07.9 CHEST PAIN: Status: ACTIVE | Noted: 2020-04-23

## 2020-04-23 PROBLEM — I20.8 ANGINAL CHEST PAIN AT REST (HCC): Status: ACTIVE | Noted: 2020-04-23

## 2020-04-23 PROBLEM — I20.89 ANGINAL CHEST PAIN AT REST: Status: ACTIVE | Noted: 2020-04-23

## 2020-04-23 LAB
ALBUMIN SERPL-MCNC: 4.5 G/DL (ref 3.5–4.6)
ALP BLD-CCNC: 88 U/L (ref 35–104)
ALT SERPL-CCNC: 29 U/L (ref 0–41)
ANION GAP SERPL CALCULATED.3IONS-SCNC: 13 MEQ/L (ref 9–15)
APTT: 31.2 SEC (ref 24.4–36.8)
AST SERPL-CCNC: 20 U/L (ref 0–40)
BASOPHILS ABSOLUTE: 0 K/UL (ref 0–0.2)
BASOPHILS RELATIVE PERCENT: 0.2 %
BILIRUB SERPL-MCNC: 0.6 MG/DL (ref 0.2–0.7)
BUN BLDV-MCNC: 17 MG/DL (ref 6–20)
C-REACTIVE PROTEIN, HIGH SENSITIVITY: 2.6 MG/L (ref 0–5)
CALCIUM SERPL-MCNC: 9.6 MG/DL (ref 8.5–9.9)
CHLORIDE BLD-SCNC: 103 MEQ/L (ref 95–107)
CO2: 27 MEQ/L (ref 20–31)
CREAT SERPL-MCNC: 0.75 MG/DL (ref 0.7–1.2)
EOSINOPHILS ABSOLUTE: 0.1 K/UL (ref 0–0.7)
EOSINOPHILS RELATIVE PERCENT: 1.2 %
GFR AFRICAN AMERICAN: >60
GFR NON-AFRICAN AMERICAN: >60
GLOBULIN: 2.9 G/DL (ref 2.3–3.5)
GLUCOSE BLD-MCNC: 106 MG/DL (ref 70–99)
HCT VFR BLD CALC: 48.3 % (ref 42–52)
HEMOGLOBIN: 16.3 G/DL (ref 14–18)
INR BLD: 0.9
LYMPHOCYTES ABSOLUTE: 1 K/UL (ref 1–4.8)
LYMPHOCYTES RELATIVE PERCENT: 18.4 %
MAGNESIUM: 2.2 MG/DL (ref 1.7–2.4)
MCH RBC QN AUTO: 31.8 PG (ref 27–31.3)
MCHC RBC AUTO-ENTMCNC: 33.7 % (ref 33–37)
MCV RBC AUTO: 94.4 FL (ref 80–100)
MONOCYTES ABSOLUTE: 0.4 K/UL (ref 0.2–0.8)
MONOCYTES RELATIVE PERCENT: 8.2 %
NEUTROPHILS ABSOLUTE: 3.8 K/UL (ref 1.4–6.5)
NEUTROPHILS RELATIVE PERCENT: 72 %
PDW BLD-RTO: 13.4 % (ref 11.5–14.5)
PLATELET # BLD: 121 K/UL (ref 130–400)
POTASSIUM SERPL-SCNC: 4.3 MEQ/L (ref 3.4–4.9)
PRO-BNP: 32 PG/ML
PRO-BNP: 32 PG/ML
PROTHROMBIN TIME: 12.6 SEC (ref 12.3–14.9)
RBC # BLD: 5.11 M/UL (ref 4.7–6.1)
SODIUM BLD-SCNC: 143 MEQ/L (ref 135–144)
TOTAL CK: 96 U/L (ref 0–190)
TOTAL PROTEIN: 7.4 G/DL (ref 6.3–8)
TROPONIN: <0.01 NG/ML (ref 0–0.01)
TSH SERPL DL<=0.05 MIU/L-ACNC: 1.36 UIU/ML (ref 0.44–3.86)
WBC # BLD: 5.3 K/UL (ref 4.8–10.8)

## 2020-04-23 PROCEDURE — 71045 X-RAY EXAM CHEST 1 VIEW: CPT

## 2020-04-23 PROCEDURE — 85610 PROTHROMBIN TIME: CPT

## 2020-04-23 PROCEDURE — 80053 COMPREHEN METABOLIC PANEL: CPT

## 2020-04-23 PROCEDURE — 84484 ASSAY OF TROPONIN QUANT: CPT

## 2020-04-23 PROCEDURE — 6370000000 HC RX 637 (ALT 250 FOR IP): Performed by: STUDENT IN AN ORGANIZED HEALTH CARE EDUCATION/TRAINING PROGRAM

## 2020-04-23 PROCEDURE — 83880 ASSAY OF NATRIURETIC PEPTIDE: CPT

## 2020-04-23 PROCEDURE — 6370000000 HC RX 637 (ALT 250 FOR IP): Performed by: INTERNAL MEDICINE

## 2020-04-23 PROCEDURE — 86141 C-REACTIVE PROTEIN HS: CPT

## 2020-04-23 PROCEDURE — 96372 THER/PROPH/DIAG INJ SC/IM: CPT

## 2020-04-23 PROCEDURE — 99285 EMERGENCY DEPT VISIT HI MDM: CPT

## 2020-04-23 PROCEDURE — 96374 THER/PROPH/DIAG INJ IV PUSH: CPT

## 2020-04-23 PROCEDURE — 96375 TX/PRO/DX INJ NEW DRUG ADDON: CPT

## 2020-04-23 PROCEDURE — 83735 ASSAY OF MAGNESIUM: CPT

## 2020-04-23 PROCEDURE — 99219 PR INITIAL OBSERVATION CARE/DAY 50 MINUTES: CPT | Performed by: INTERNAL MEDICINE

## 2020-04-23 PROCEDURE — 85025 COMPLETE CBC W/AUTO DIFF WBC: CPT

## 2020-04-23 PROCEDURE — 2500000003 HC RX 250 WO HCPCS: Performed by: STUDENT IN AN ORGANIZED HEALTH CARE EDUCATION/TRAINING PROGRAM

## 2020-04-23 PROCEDURE — G0378 HOSPITAL OBSERVATION PER HR: HCPCS

## 2020-04-23 PROCEDURE — 36415 COLL VENOUS BLD VENIPUNCTURE: CPT

## 2020-04-23 PROCEDURE — 2580000003 HC RX 258: Performed by: INTERNAL MEDICINE

## 2020-04-23 PROCEDURE — 84443 ASSAY THYROID STIM HORMONE: CPT

## 2020-04-23 PROCEDURE — 82550 ASSAY OF CK (CPK): CPT

## 2020-04-23 PROCEDURE — 6360000002 HC RX W HCPCS: Performed by: STUDENT IN AN ORGANIZED HEALTH CARE EDUCATION/TRAINING PROGRAM

## 2020-04-23 PROCEDURE — 85730 THROMBOPLASTIN TIME PARTIAL: CPT

## 2020-04-23 PROCEDURE — 93005 ELECTROCARDIOGRAM TRACING: CPT | Performed by: STUDENT IN AN ORGANIZED HEALTH CARE EDUCATION/TRAINING PROGRAM

## 2020-04-23 RX ORDER — ASPIRIN 81 MG/1
324 TABLET, CHEWABLE ORAL ONCE
Status: COMPLETED | OUTPATIENT
Start: 2020-04-23 | End: 2020-04-23

## 2020-04-23 RX ORDER — NITROGLYCERIN 0.4 MG/1
0.4 TABLET SUBLINGUAL EVERY 5 MIN PRN
Status: DISCONTINUED | OUTPATIENT
Start: 2020-04-23 | End: 2020-04-23

## 2020-04-23 RX ORDER — NITROGLYCERIN 0.4 MG/1
0.4 TABLET SUBLINGUAL EVERY 5 MIN PRN
Status: DISCONTINUED | OUTPATIENT
Start: 2020-04-23 | End: 2020-04-24 | Stop reason: HOSPADM

## 2020-04-23 RX ORDER — ATORVASTATIN CALCIUM 20 MG/1
20 TABLET, FILM COATED ORAL NIGHTLY
Status: DISCONTINUED | OUTPATIENT
Start: 2020-04-23 | End: 2020-04-24 | Stop reason: HOSPADM

## 2020-04-23 RX ORDER — POLYETHYLENE GLYCOL 3350 17 G/17G
17 POWDER, FOR SOLUTION ORAL DAILY PRN
Status: DISCONTINUED | OUTPATIENT
Start: 2020-04-23 | End: 2020-04-24 | Stop reason: HOSPADM

## 2020-04-23 RX ORDER — METOPROLOL TARTRATE 5 MG/5ML
5 INJECTION INTRAVENOUS ONCE
Status: COMPLETED | OUTPATIENT
Start: 2020-04-23 | End: 2020-04-23

## 2020-04-23 RX ORDER — ACETAMINOPHEN 325 MG/1
650 TABLET ORAL EVERY 6 HOURS PRN
Status: DISCONTINUED | OUTPATIENT
Start: 2020-04-23 | End: 2020-04-24 | Stop reason: HOSPADM

## 2020-04-23 RX ORDER — ACETAMINOPHEN 500 MG
1000 TABLET ORAL ONCE
Status: COMPLETED | OUTPATIENT
Start: 2020-04-23 | End: 2020-04-23

## 2020-04-23 RX ORDER — PROMETHAZINE HYDROCHLORIDE 12.5 MG/1
12.5 TABLET ORAL EVERY 6 HOURS PRN
Status: DISCONTINUED | OUTPATIENT
Start: 2020-04-23 | End: 2020-04-24 | Stop reason: HOSPADM

## 2020-04-23 RX ORDER — ASPIRIN 81 MG/1
81 TABLET ORAL DAILY
Status: DISCONTINUED | OUTPATIENT
Start: 2020-04-23 | End: 2020-04-23 | Stop reason: SDUPTHER

## 2020-04-23 RX ORDER — SODIUM CHLORIDE 0.9 % (FLUSH) 0.9 %
10 SYRINGE (ML) INJECTION EVERY 12 HOURS SCHEDULED
Status: DISCONTINUED | OUTPATIENT
Start: 2020-04-23 | End: 2020-04-24 | Stop reason: HOSPADM

## 2020-04-23 RX ORDER — ACETAMINOPHEN 650 MG/1
650 SUPPOSITORY RECTAL EVERY 6 HOURS PRN
Status: DISCONTINUED | OUTPATIENT
Start: 2020-04-23 | End: 2020-04-24 | Stop reason: HOSPADM

## 2020-04-23 RX ORDER — ONDANSETRON 2 MG/ML
4 INJECTION INTRAMUSCULAR; INTRAVENOUS EVERY 6 HOURS PRN
Status: DISCONTINUED | OUTPATIENT
Start: 2020-04-23 | End: 2020-04-24 | Stop reason: HOSPADM

## 2020-04-23 RX ORDER — SODIUM CHLORIDE 0.9 % (FLUSH) 0.9 %
10 SYRINGE (ML) INJECTION PRN
Status: DISCONTINUED | OUTPATIENT
Start: 2020-04-23 | End: 2020-04-24 | Stop reason: HOSPADM

## 2020-04-23 RX ORDER — KETOROLAC TROMETHAMINE 30 MG/ML
30 INJECTION, SOLUTION INTRAMUSCULAR; INTRAVENOUS ONCE
Status: COMPLETED | OUTPATIENT
Start: 2020-04-23 | End: 2020-04-23

## 2020-04-23 RX ORDER — ASPIRIN 81 MG/1
81 TABLET, CHEWABLE ORAL DAILY
Status: DISCONTINUED | OUTPATIENT
Start: 2020-04-24 | End: 2020-04-24 | Stop reason: HOSPADM

## 2020-04-23 RX ADMIN — ACETAMINOPHEN 650 MG: 325 TABLET ORAL at 16:45

## 2020-04-23 RX ADMIN — METOPROLOL TARTRATE 25 MG: 25 TABLET, FILM COATED ORAL at 20:14

## 2020-04-23 RX ADMIN — KETOROLAC TROMETHAMINE 30 MG: 30 INJECTION, SOLUTION INTRAMUSCULAR at 11:03

## 2020-04-23 RX ADMIN — NITROGLYCERIN 0.4 MG: 0.4 TABLET, ORALLY DISINTEGRATING SUBLINGUAL at 10:51

## 2020-04-23 RX ADMIN — METOPROLOL TARTRATE 5 MG: 5 INJECTION INTRAVENOUS at 11:44

## 2020-04-23 RX ADMIN — ACETAMINOPHEN 1000 MG: 500 TABLET ORAL at 10:42

## 2020-04-23 RX ADMIN — NITROGLYCERIN 0.4 MG: 0.4 TABLET, ORALLY DISINTEGRATING SUBLINGUAL at 10:42

## 2020-04-23 RX ADMIN — ASPIRIN 81 MG 324 MG: 81 TABLET ORAL at 10:42

## 2020-04-23 RX ADMIN — Medication 10 ML: at 20:15

## 2020-04-23 RX ADMIN — ENOXAPARIN SODIUM 100 MG: 100 INJECTION, SOLUTION INTRAVENOUS; SUBCUTANEOUS at 11:44

## 2020-04-23 RX ADMIN — ATORVASTATIN CALCIUM 20 MG: 20 TABLET, FILM COATED ORAL at 20:15

## 2020-04-23 ASSESSMENT — ENCOUNTER SYMPTOMS
VOMITING: 0
EYES NEGATIVE: 1
SHORTNESS OF BREATH: 1
BACK PAIN: 0
GASTROINTESTINAL NEGATIVE: 1
SHORTNESS OF BREATH: 0
SINUS PRESSURE: 0
CHEST TIGHTNESS: 0
NAUSEA: 0
COUGH: 0
WHEEZING: 0
STRIDOR: 0
DIARRHEA: 0
ABDOMINAL PAIN: 0
BLOOD IN STOOL: 0
TROUBLE SWALLOWING: 0

## 2020-04-23 ASSESSMENT — PAIN DESCRIPTION - DESCRIPTORS
DESCRIPTORS: PRESSURE
DESCRIPTORS: ACHING
DESCRIPTORS: PRESSURE
DESCRIPTORS: ACHING
DESCRIPTORS: PRESSURE
DESCRIPTORS: PRESSURE;DULL
DESCRIPTORS: PRESSURE

## 2020-04-23 ASSESSMENT — HEART SCORE: ECG: 0

## 2020-04-23 ASSESSMENT — PAIN DESCRIPTION - PAIN TYPE
TYPE: ACUTE PAIN

## 2020-04-23 ASSESSMENT — PAIN SCALES - GENERAL
PAINLEVEL_OUTOF10: 3

## 2020-04-23 ASSESSMENT — PAIN DESCRIPTION - LOCATION
LOCATION: CHEST
LOCATION: HEAD
LOCATION: CHEST
LOCATION: CHEST
LOCATION: HEAD

## 2020-04-23 ASSESSMENT — PAIN DESCRIPTION - ORIENTATION
ORIENTATION: MID
ORIENTATION: LEFT

## 2020-04-23 ASSESSMENT — PAIN DESCRIPTION - FREQUENCY
FREQUENCY: CONTINUOUS
FREQUENCY: CONTINUOUS

## 2020-04-23 NOTE — PROGRESS NOTES
Advanced Care Planning:  Reviewed with the patient the [address] of /Living Will Declaration forms. Reviewed the process of designating a competent adult as an Agent (or -in-fact) that could take make health care decisions for the patient if incompetent.  Patient was asked to complete the declaration forms, either acknowledge the forms by a public notary or an eligible witness and provide a signed copy to the hospital.    Time spent (minutes): [address]

## 2020-04-23 NOTE — TELEPHONE ENCOUNTER
Reason for Disposition   Chest pain lasting longer than 5 minutes and ANY of the following:* Over 48years old* Over 27years old and at least one cardiac risk factor (i.e., high blood pressure, diabetes, high cholesterol, obesity, smoker or strong family history of heart disease)* Pain is crushing, pressure-like, or heavy * Took nitroglycerin and chest pain was not relieved* History of heart disease (i.e., angina, heart attack, bypass surgery, angioplasty, CHF)    Answer Assessment - Initial Assessment Questions  1. LOCATION: \"Where does it hurt? \"         Lethea Silva slightly left of sternum    2. RADIATION: \"Does the pain go anywhere else? \" (e.g., into neck, jaw, arms, back)       A little bit of pain in right side yesterday, new onset of L arm slightly numb        3. ONSET: \"When did the chest pain begin? \" (Minutes, hours or days)       Months ago off and on but more consistent the past few days    4. PATTERN \"Does the pain come and go, or has it been constant since it started? \"  \"Does it get worse with exertion? \"       Intermittent but the past 3 days has been constant     5. DURATION: \"How long does it last\" (e.g., seconds, minutes, hours)        24/7 but more aware at times of resting     6. SEVERITY: \"How bad is the pain? \"  (e.g., Scale 1-10; mild, moderate, or severe)     - MILD (1-3): doesn't interfere with normal activities      - MODERATE (4-7): interferes with normal activities or awakens from sleep     - SEVERE (8-10): excruciating pain, unable to do any normal activities                  3/10- dull and pressure feeling     7. CARDIAC RISK FACTORS: \"Do you have any history of heart problems or risk factors for heart disease? \" (e.g., prior heart attack, angina; high blood pressure, diabetes, being overweight, high cholesterol, smoking, or strong family history of heart disease)       Denies any outside of recent concerns - does have family hx and pt did have HTN but it is currently controlled    8.

## 2020-04-23 NOTE — H&P
Never   Lifestyle    Physical activity     Days per week: None     Minutes per session: None    Stress: None   Relationships    Social connections     Talks on phone: None     Gets together: None     Attends Orthodox service: None     Active member of club or organization: None     Attends meetings of clubs or organizations: None     Relationship status: None    Intimate partner violence     Fear of current or ex partner: None     Emotionally abused: None     Physically abused: None     Forced sexual activity: None   Other Topics Concern    None   Social History Narrative    None       Family Hx:  Family History   Problem Relation Age of Onset    Cancer Mother     Heart Disease Mother        Allergies   Allergen Reactions    Bee Venom      Other reaction(s): Unknown       Current Facility-Administered Medications   Medication Dose Route Frequency Provider Last Rate Last Dose    nitroGLYCERIN (NITROSTAT) SL tablet 0.4 mg  0.4 mg Sublingual Q5 Min PRN [place] URSULA Mattson, DO   0.4 mg at 04/23/20 1051    aspirin EC tablet 81 mg  81 mg Oral Daily Mariano Rudd MD        metoprolol tartrate (LOPRESSOR) tablet 25 mg  25 mg Oral BID Mariano Rudd MD        triamcinolone acetonide (KENALOG-40) injection 80 mg  80 mg Intramuscular Once AnyMeeting, APRN - CNP        triamcinolone acetonide (KENALOG-40) injection 40 mg  40 mg Intramuscular Once AnyMeeting, APRN - CNP         Current Outpatient Medications   Medication Sig Dispense Refill    sildenafil (VIAGRA) 100 MG tablet Take 1 tablet by mouth daily as needed for Erectile Dysfunction 60 tablet 1    sildenafil (VIAGRA) 50 MG tablet Take 2 tablets by mouth as needed for Erectile Dysfunction 30 tablet 3    tadalafil (CIALIS) 5 MG tablet Take 1 tablet by mouth daily 30 tablet 0    albuterol sulfate HFA (PROAIR HFA) 108 (90 Base) MCG/ACT inhaler Inhale 2 puffs into the lungs every 6 hours as needed for Wheezing 1 Inhaler 3       Review of Systems: Review of Systems   Constitutional: Negative. Negative for diaphoresis and fatigue. HENT: Negative. Eyes: Negative. Respiratory: Positive for shortness of breath. Negative for cough, chest tightness, wheezing and stridor. Cardiovascular: Positive for chest pain. Negative for palpitations and leg swelling. Gastrointestinal: Negative. Negative for blood in stool and nausea. Genitourinary: Negative. Musculoskeletal: Negative. Skin: Negative. Neurological: Negative. Negative for dizziness, syncope, weakness and light-headedness. Hematological: Negative. Psychiatric/Behavioral: Negative. Physical Examination:    /68   Pulse 55   Temp 98 °F (36.7 °C) (Oral)   Resp 14   Ht 5' 10\" (1.778 m)   Wt 212 lb (96.2 kg)   SpO2 99%   BMI 30.42 kg/m²    Physical Exam   Constitutional: He appears healthy. No distress. HENT:   Normal cephalic and Atraumatic   Eyes: Pupils are equal, round, and reactive to light. Neck: Normal range of motion and thyroid normal. Neck supple. No JVD present. No neck adenopathy. No thyromegaly present. Cardiovascular: Normal rate, regular rhythm, normal heart sounds, intact distal pulses and normal pulses. Pulmonary/Chest: Effort normal and breath sounds normal. He has no wheezes. He has no rales. He exhibits no tenderness. Abdominal: Soft. Bowel sounds are normal. There is no abdominal tenderness. Musculoskeletal: Normal range of motion. General: No tenderness or edema. Neurological: He is alert and oriented to person, place, and time. Skin: Skin is warm. No cyanosis. Nails show no clubbing.          LABS:  CBC:   Lab Results   Component Value Date    WBC 5.3 04/23/2020    RBC 5.11 04/23/2020    HGB 16.3 04/23/2020    HCT 48.3 04/23/2020    MCV 94.4 04/23/2020    MCH 31.8 04/23/2020    MCHC 33.7 04/23/2020    RDW 13.4 04/23/2020     04/23/2020    MPV 8.5 09/10/2014     CBC with Differential:    Lab Results   Component Value Date    WBC 5.3 04/23/2020    RBC 5.11 04/23/2020    HGB 16.3 04/23/2020    HCT 48.3 04/23/2020     04/23/2020    MCV 94.4 04/23/2020    MCH 31.8 04/23/2020    MCHC 33.7 04/23/2020    RDW 13.4 04/23/2020    LYMPHOPCT 18.4 04/23/2020    MONOPCT 8.2 04/23/2020    BASOPCT 0.2 04/23/2020    MONOSABS 0.4 04/23/2020    LYMPHSABS 1.0 04/23/2020    EOSABS 0.1 04/23/2020    BASOSABS 0.0 04/23/2020     CMP:    Lab Results   Component Value Date     04/23/2020    K 4.3 04/23/2020     04/23/2020    CO2 27 04/23/2020    BUN 17 04/23/2020    CREATININE 0.75 04/23/2020    GFRAA >60.0 04/23/2020    LABGLOM >60.0 04/23/2020    GLUCOSE 106 04/23/2020    PROT 7.4 04/23/2020    LABALBU 4.5 04/23/2020    CALCIUM 9.6 04/23/2020    BILITOT 0.6 04/23/2020    ALKPHOS 88 04/23/2020    AST 20 04/23/2020    ALT 29 04/23/2020     BMP:    Lab Results   Component Value Date     04/23/2020    K 4.3 04/23/2020     04/23/2020    CO2 27 04/23/2020    BUN 17 04/23/2020    LABALBU 4.5 04/23/2020    CREATININE 0.75 04/23/2020    CALCIUM 9.6 04/23/2020    GFRAA >60.0 04/23/2020    LABGLOM >60.0 04/23/2020    GLUCOSE 106 04/23/2020     Magnesium:    Lab Results   Component Value Date    MG 2.2 04/23/2020     Troponin:    Lab Results   Component Value Date    TROPONINI <0.010 04/23/2020       Active Hospital Problems    Diagnosis Date Noted    Chest pain [R07.9] 04/23/2020     Priority: High    Anginal chest pain at rest [facility]) [I20.8] 04/23/2020     Priority: Low        Assessment/Plan:  1. CP - rule out for ACS.    2. ASA Lovenox BB     Electronically signed by Jagruti Shin MD on 4/23/2020 at 12:24 PM

## 2020-04-23 NOTE — ED PROVIDER NOTES
frequency. Musculoskeletal: Negative for back pain and myalgias. Skin: Negative for pallor and rash. Neurological: Negative for syncope, weakness and headaches. Hematological: Does not bruise/bleed easily. All other systems reviewed and are negative. Except as noted above the remainder of the review of systems was reviewed and negative.        PAST MEDICAL HISTORY     Past Medical History:   Diagnosis Date    Atypical chest pain, Dr Gideon Ashby, neg GXT, 2014 10/7/2015    [facility] [address]     Emphysema of lung ([facility] [address]     Health maintenance examination 10/7/2015    Left nephrolithiasis 10/7/2015    Lumbago 4/27/2004    Medial epicondylitis of left elbow, Drummer, RN 10/4/2018    Shortness of breath 10/7/2015         SURGICALHISTORY       Past Surgical History:   Procedure Laterality Date    AMPUTATION Left     ring finger tip    [place] COLON CA SCRN NOT HI RSK IND N/A 11/1/2018    [facility]         CURRENT MEDICATIONS       Previous Medications    ALBUTEROL SULFATE HFA (PROAIR HFA) 108 (90 BASE) MCG/ACT INHALER    Inhale 2 puffs into the lungs every 6 hours as needed for Wheezing    SILDENAFIL (VIAGRA) 100 MG TABLET    Take 1 tablet by mouth daily as needed for Erectile Dysfunction    SILDENAFIL (VIAGRA) 50 MG TABLET    Take 2 tablets by mouth as needed for Erectile Dysfunction    TADALAFIL (CIALIS) 5 MG TABLET    Take 1 tablet by mouth daily       ALLERGIES     Bee venom    FAMILY HISTORY       Family History   Problem Relation Age of Onset    Cancer Mother     Heart Disease Mother           SOCIAL HISTORY       Social History     Socioeconomic History    Marital status: Single     Spouse name: None    Number of children: None    Years of education: None    Highest education level: None   Occupational History    None   Social Needs    Financial resource strain: None    Food insecurity     Worry: None     Inability: None    Transportation needs     Medical: None     Non-medical: None   Tobacco Use    Smoking status: Never Smoker    Smokeless tobacco: Never Used   Substance and Sexual Activity    Alcohol use: No    Drug use: No    Sexual activity: Never   Lifestyle    Physical activity     Days per week: None     Minutes per session: None    Stress: None   Relationships    Social connections     Talks on phone: None     Gets together: None     Attends Hindu service: None     Active member of club or organization: None     Attends meetings of clubs or organizations: None     Relationship status: None    Intimate partner violence     Fear of current or ex partner: None     Emotionally abused: None     Physically abused: None     Forced sexual activity: None   Other Topics Concern    None   Social History Narrative    None       SCREENINGS      @FLOW([id number])@      PHYSICAL EXAM    (up to 7 for level 4, 8 or more for level 5)     ED Triage Vitals   BP Temp Temp Source Pulse Resp SpO2 Height Weight   04/23/20 1020 04/23/20 1015 04/23/20 1015 04/23/20 1020 04/23/20 1020 04/23/20 1020 04/23/20 1015 04/23/20 1015   (!) 148/101 98 °F (36.7 °C) Oral 69 16 100 % 5' 10\" (1.778 m) 212 lb (96.2 kg)       Physical Exam  Vitals signs and nursing note reviewed. Constitutional:       General: He is awake. He is not in acute distress. Appearance: Normal appearance. He is well-developed and normal weight. He is not ill-appearing, toxic-appearing or diaphoretic. Comments: No photophobia. No phonophobia. HENT:      Head: Normocephalic and atraumatic. No Sainz's sign. Right Ear: External ear normal.      Left Ear: External ear normal.      Nose: Nose normal. No congestion or rhinorrhea. Mouth/Throat:      Mouth: Mucous membranes are moist.      Pharynx: Oropharynx is clear. No oropharyngeal exudate or posterior oropharyngeal erythema.    Eyes:      General: No scleral icterus. Right eye: No foreign body or discharge. Left eye: No discharge. Extraocular Movements: Extraocular movements intact. Conjunctiva/sclera: Conjunctivae normal.      Left eye: No exudate. Pupils: Pupils are equal, round, and reactive to light. Neck:      Musculoskeletal: Normal range of motion and neck supple. No neck rigidity. Vascular: No JVD. Trachea: No tracheal deviation. Comments: No meningismus. Cardiovascular:      Rate and Rhythm: Normal rate and regular rhythm. Pulses: Normal pulses. Heart sounds: Normal heart sounds. Heart sounds not distant. No murmur. No friction rub. No gallop. Pulmonary:      Effort: Pulmonary effort is normal. No respiratory distress. Breath sounds: Normal breath sounds. No stridor. No wheezing, rhonchi or rales. Chest:      Chest wall: No tenderness. Abdominal:      General: Abdomen is flat. Bowel sounds are normal. There is no distension. Palpations: Abdomen is soft. There is no mass. Tenderness: There is no abdominal tenderness. There is no right CVA tenderness, left CVA tenderness, guarding or rebound. Hernia: No hernia is present. Musculoskeletal: Normal range of motion. General: No swelling, tenderness, deformity or signs of injury. Lymphadenopathy:      Head:      Right side of head: No submental adenopathy. Left side of head: No submental adenopathy. Skin:     General: Skin is warm and dry. Capillary Refill: Capillary refill takes less than 2 seconds. Coloration: Skin is not jaundiced or pale. Findings: No bruising, erythema, lesion or rash. Neurological:      General: No focal deficit present. Mental Status: He is alert and oriented to person, place, and time. Mental status is at baseline. Cranial Nerves: No cranial nerve deficit. Sensory: No sensory deficit. Motor: No weakness.       Coordination: Coordination normal. Deep Tendon Reflexes: Reflexes are normal and symmetric. Psychiatric:         Mood and Affect: Mood normal.         Behavior: Behavior normal. Behavior is cooperative. Thought Content: Thought content normal.         Judgment: Judgment normal.         DIAGNOSTIC RESULTS     EKG: All EKG's are interpreted by the Emergency Department Physician who either signs or Co-signsthis chart in the absence of a cardiologist.    EKG: Normal sinus rhythm at 65 bpm, normal axis, normal ST segments, QT intervals 382 ms, QTC is 397 ms, no PVCs. RADIOLOGY:   Non-plain filmimages such as CT, Ultrasound and MRI are read by the radiologist. Plain radiographic images are visualized and preliminarily interpreted by the emergency physician with the below findings:    Chest X-ray: No infiltrate, no pleural effusion, no pneumothorax, no free air. Interpretation per the Radiologist below, if available at the time ofthis note:    XR CHEST PORTABLE    (Results Pending)         ED BEDSIDE ULTRASOUND:   Performed by ED Physician - none    LABS:  Labs Reviewed   CBC WITH AUTO DIFFERENTIAL - Abnormal; Notable for the following components:       Result Value    MCH 31.8 (*)     Platelets 436 (*)     All other components within normal limits   COMPREHENSIVE METABOLIC PANEL - Abnormal; Notable for the following components:    Glucose 106 (*)     All other components within normal limits   APTT   BRAIN NATRIURETIC PEPTIDE   CK   HIGH SENSITIVITY CRP   MAGNESIUM   PROTIME-INR   TROPONIN   TSH WITHOUT REFLEX       All other labs were within normal range or not returned as of this dictation. EMERGENCY DEPARTMENT COURSE and DIFFERENTIAL DIAGNOSIS/MDM:   Vitals:    Vitals:    04/23/20 1020 04/23/20 1030 04/23/20 1049 04/23/20 1100   BP: (!) 148/101 (!) 150/87 118/75 112/77   Pulse: 69 65 89 86   Resp: 16 16 16 16   Temp:       TempSrc:       SpO2: 100% 100% 98% 97%   Weight:       Height:               MDM  Heart score is 4.   Aspirin and

## 2020-04-24 ENCOUNTER — TELEPHONE (OUTPATIENT)
Dept: FAMILY MEDICINE CLINIC | Age: 55
End: 2020-04-24

## 2020-04-24 VITALS
OXYGEN SATURATION: 97 % | RESPIRATION RATE: 16 BRPM | BODY MASS INDEX: 30.75 KG/M2 | HEIGHT: 70 IN | TEMPERATURE: 97.9 F | HEART RATE: 57 BPM | WEIGHT: 214.8 LBS | SYSTOLIC BLOOD PRESSURE: 112 MMHG | DIASTOLIC BLOOD PRESSURE: 61 MMHG

## 2020-04-24 LAB
CHOLESTEROL, TOTAL: 167 MG/DL (ref 0–199)
EKG ATRIAL RATE: 56 BPM
EKG ATRIAL RATE: 65 BPM
EKG P AXIS: -11 DEGREES
EKG P AXIS: 59 DEGREES
EKG P-R INTERVAL: 146 MS
EKG P-R INTERVAL: 162 MS
EKG Q-T INTERVAL: 382 MS
EKG Q-T INTERVAL: 414 MS
EKG QRS DURATION: 88 MS
EKG QRS DURATION: 90 MS
EKG QTC CALCULATION (BAZETT): 397 MS
EKG QTC CALCULATION (BAZETT): 399 MS
EKG R AXIS: 49 DEGREES
EKG R AXIS: 49 DEGREES
EKG T AXIS: 38 DEGREES
EKG T AXIS: 53 DEGREES
EKG VENTRICULAR RATE: 56 BPM
EKG VENTRICULAR RATE: 65 BPM
HCT VFR BLD CALC: 44.1 % (ref 42–52)
HDLC SERPL-MCNC: 29 MG/DL (ref 40–59)
HEMOGLOBIN: 15 G/DL (ref 14–18)
LDL CHOLESTEROL CALCULATED: 90 MG/DL (ref 0–129)
MAGNESIUM: 2.2 MG/DL (ref 1.7–2.4)
MCH RBC QN AUTO: 32 PG (ref 27–31.3)
MCHC RBC AUTO-ENTMCNC: 34.1 % (ref 33–37)
MCV RBC AUTO: 93.9 FL (ref 80–100)
PDW BLD-RTO: 13.6 % (ref 11.5–14.5)
PLATELET # BLD: 125 K/UL (ref 130–400)
RBC # BLD: 4.7 M/UL (ref 4.7–6.1)
TRIGL SERPL-MCNC: 238 MG/DL (ref 0–150)
WBC # BLD: 5.7 K/UL (ref 4.8–10.8)

## 2020-04-24 PROCEDURE — 83735 ASSAY OF MAGNESIUM: CPT

## 2020-04-24 PROCEDURE — 6370000000 HC RX 637 (ALT 250 FOR IP): Performed by: INTERNAL MEDICINE

## 2020-04-24 PROCEDURE — 93005 ELECTROCARDIOGRAM TRACING: CPT | Performed by: INTERNAL MEDICINE

## 2020-04-24 PROCEDURE — 36415 COLL VENOUS BLD VENIPUNCTURE: CPT

## 2020-04-24 PROCEDURE — G0378 HOSPITAL OBSERVATION PER HR: HCPCS

## 2020-04-24 PROCEDURE — 99217 PR OBSERVATION CARE DISCHARGE MANAGEMENT: CPT | Performed by: INTERNAL MEDICINE

## 2020-04-24 PROCEDURE — 85027 COMPLETE CBC AUTOMATED: CPT

## 2020-04-24 PROCEDURE — 80061 LIPID PANEL: CPT

## 2020-04-24 RX ADMIN — ACETAMINOPHEN 650 MG: 325 TABLET ORAL at 11:13

## 2020-04-24 RX ADMIN — ASPIRIN 81 MG 81 MG: 81 TABLET ORAL at 08:23

## 2020-04-24 ASSESSMENT — PAIN DESCRIPTION - LOCATION
LOCATION: CHEST
LOCATION: CHEST

## 2020-04-24 ASSESSMENT — PAIN SCALES - GENERAL
PAINLEVEL_OUTOF10: 2
PAINLEVEL_OUTOF10: 2

## 2020-04-24 NOTE — CARE COORDINATION
QUALITY ROUNDS COMPLETED WITH RN. PT FROM HOME WITH SPOUSE, INDEPENDENT. NO NEEDS IDENTIFIED.  NO ADVANCE DIRECTIVES PER BREN

## 2020-04-24 NOTE — DISCHARGE SUMMARY
tablets by mouth as needed for Erectile Dysfunction  Qty: 30 tablet, Refills: 3    Associated Diagnoses: Erectile dysfunction, unspecified erectile dysfunction type      albuterol sulfate HFA (PROAIR HFA) 108 (90 Base) MCG/ACT inhaler Inhale 2 puffs into the lungs every 6 hours as needed for Wheezing  Qty: 1 Inhaler, Refills: 3    Associated Diagnoses: Shortness of breath; Chronic obstructive pulmonary disease, unspecified COPD type (HCC)         STOP taking these medications       tadalafil (CIALIS) 5 MG tablet Comments:   Reason for Stopping:               Significant Diagnostics:   Radiology: Xr Chest Portable    Result Date: 4/23/2020  Portable chest radiograph History: Chest pain Technique: AP portable view of the chest obtained. Comparison: Chest x-rays from March 11, 2019 Findings: The cardiomediastinal silhouette is within normal limits. No pneumothorax, pleural effusion, or consolidation. Bones of the thorax appear intact. No radiographic evidence of acute intrathoracic process.        Labs:   Recent Results (from the past 72 hour(s))   EKG 12 Lead    Collection Time: 04/23/20 10:16 AM   Result Value Ref Range    Ventricular Rate 65 BPM    Atrial Rate 65 BPM    P-R Interval 146 ms    QRS Duration 88 ms    Q-T Interval 382 ms    QTc Calculation (Bazett) 397 ms    P Axis 59 degrees    R Axis 49 degrees    T Axis 53 degrees   APTT    Collection Time: 04/23/20 10:30 AM   Result Value Ref Range    aPTT 31.2 24.4 - 36.8 sec   Brain Natriuretic Peptide    Collection Time: 04/23/20 10:30 AM   Result Value Ref Range    Pro-BNP 32 pg/mL   CBC Auto Differential    Collection Time: 04/23/20 10:30 AM   Result Value Ref Range    WBC 5.3 4.8 - 10.8 K/uL    RBC 5.11 4.70 - 6.10 M/uL    Hemoglobin 16.3 14.0 - 18.0 g/dL    Hematocrit 48.3 42.0 - 52.0 %    MCV 94.4 80.0 - 100.0 fL    MCH 31.8 (H) 27.0 - 31.3 pg    MCHC 33.7 33.0 - 37.0 %    RDW 13.4 11.5 - 14.5 %    Platelets 428 (L) 945 - 400 K/uL    Neutrophils % 72.0 %    Lymphocytes % 18.4 %    Monocytes % 8.2 %    Eosinophils % 1.2 %    Basophils % 0.2 %    Neutrophils Absolute 3.8 1.4 - 6.5 K/uL    Lymphocytes Absolute 1.0 1.0 - 4.8 K/uL    Monocytes Absolute 0.4 0.2 - 0.8 K/uL    Eosinophils Absolute 0.1 0.0 - 0.7 K/uL    Basophils Absolute 0.0 0.0 - 0.2 K/uL   CK    Collection Time: 04/23/20 10:30 AM   Result Value Ref Range    Total CK 96 0 - 190 U/L   Comprehensive Metabolic Panel    Collection Time: 04/23/20 10:30 AM   Result Value Ref Range    Sodium 143 135 - 144 mEq/L    Potassium 4.3 3.4 - 4.9 mEq/L    Chloride 103 95 - 107 mEq/L    CO2 27 20 - 31 mEq/L    Anion Gap 13 9 - 15 mEq/L    Glucose 106 (H) 70 - 99 mg/dL    BUN 17 6 - 20 mg/dL    CREATININE 0.75 0.70 - 1.20 mg/dL    GFR Non-African American >60.0 >60    GFR  >60.0 >60    Calcium 9.6 8.5 - 9.9 mg/dL    Total Protein 7.4 6.3 - 8.0 g/dL    Alb 4.5 3.5 - 4.6 g/dL    Total Bilirubin 0.6 0.2 - 0.7 mg/dL    Alkaline Phosphatase 88 35 - 104 U/L    ALT 29 0 - 41 U/L    AST 20 0 - 40 U/L    Globulin 2.9 2.3 - 3.5 g/dL   High sensitivity CRP    Collection Time: 04/23/20 10:30 AM   Result Value Ref Range    CRP High Sensitivity 2.6 0.0 - 5.0 mg/L   Magnesium    Collection Time: 04/23/20 10:30 AM   Result Value Ref Range    Magnesium 2.2 1.7 - 2.4 mg/dL   Protime-INR    Collection Time: 04/23/20 10:30 AM   Result Value Ref Range    Protime 12.6 12.3 - 14.9 sec    INR 0.9    Troponin    Collection Time: 04/23/20 10:30 AM   Result Value Ref Range    Troponin <0.010 0.000 - 0.010 ng/mL   TSH without Reflex    Collection Time: 04/23/20 10:30 AM   Result Value Ref Range    TSH 1.360 0.440 - 3.860 uIU/mL   Troponin    Collection Time: 04/23/20  1:23 PM   Result Value Ref Range    Troponin <0.010 0.000 - 0.010 ng/mL   Brain natriuretic peptide    Collection Time: 04/23/20  1:23 PM   Result Value Ref Range    Pro-BNP 32 pg/mL   Troponin    Collection Time: 04/23/20  5:25 PM   Result Value Ref Range    Troponin

## 2020-04-24 NOTE — TELEPHONE ENCOUNTER
Pt called office asking to talk to Dr Jamal Vergara regarding his stay at hospital, [facility]. Pt was admitted for chest pain. Pt states he wants to speak w/ \"Isidro\" personally - pt not willing to set up telephone visit.

## 2020-04-25 ENCOUNTER — CARE COORDINATION (OUTPATIENT)
Dept: CARE COORDINATION | Age: 55
End: 2020-04-25

## 2020-04-26 PROCEDURE — 93010 ELECTROCARDIOGRAM REPORT: CPT | Performed by: INTERNAL MEDICINE

## 2020-04-29 ENCOUNTER — TELEPHONE (OUTPATIENT)
Dept: CARDIOLOGY CLINIC | Age: 55
End: 2020-04-29

## 2020-04-29 NOTE — TELEPHONE ENCOUNTER
Pt will contact [facility] for exception form for cardiac CTA for metlee harrisont mad to discuss

## 2020-05-11 ENCOUNTER — TELEPHONE (OUTPATIENT)
Dept: CARDIOLOGY CLINIC | Age: 55
End: 2020-05-11

## 2020-05-11 ENCOUNTER — HOSPITAL ENCOUNTER (OUTPATIENT)
Dept: CARDIAC CATH/INVASIVE PROCEDURES | Age: 55
Discharge: HOME | End: 2020-05-11
Attending: INTERNAL MEDICINE | Admitting: INTERNAL MEDICINE
Payer: COMMERCIAL

## 2020-05-11 VITALS
DIASTOLIC BLOOD PRESSURE: 76 MMHG | SYSTOLIC BLOOD PRESSURE: 113 MMHG | RESPIRATION RATE: 15 BRPM | HEART RATE: 63 BPM | BODY MASS INDEX: 30.1 KG/M2 | WEIGHT: 215 LBS | OXYGEN SATURATION: 99 % | TEMPERATURE: 98.2 F | HEIGHT: 71 IN

## 2020-05-11 LAB
ANION GAP SERPL CALCULATED.3IONS-SCNC: 14 MEQ/L (ref 9–15)
BUN BLDV-MCNC: 22 MG/DL (ref 6–20)
CALCIUM SERPL-MCNC: 9.2 MG/DL (ref 8.5–9.9)
CHLORIDE BLD-SCNC: 105 MEQ/L (ref 95–107)
CO2: 23 MEQ/L (ref 20–31)
CREAT SERPL-MCNC: 0.79 MG/DL (ref 0.7–1.2)
GFR AFRICAN AMERICAN: >60
GFR NON-AFRICAN AMERICAN: >60
GLUCOSE BLD-MCNC: 105 MG/DL (ref 70–99)
HCT VFR BLD CALC: 47.4 % (ref 42–52)
HEMOGLOBIN: 16.1 G/DL (ref 14–18)
MCH RBC QN AUTO: 32 PG (ref 27–31.3)
MCHC RBC AUTO-ENTMCNC: 34 % (ref 33–37)
MCV RBC AUTO: 94.2 FL (ref 80–100)
PDW BLD-RTO: 13.3 % (ref 11.5–14.5)
PLATELET # BLD: 120 K/UL (ref 130–400)
POTASSIUM SERPL-SCNC: 4.5 MEQ/L (ref 3.4–4.9)
RBC # BLD: 5.04 M/UL (ref 4.7–6.1)
SODIUM BLD-SCNC: 142 MEQ/L (ref 135–144)
WBC # BLD: 5 K/UL (ref 4.8–10.8)

## 2020-05-11 PROCEDURE — 93458 L HRT ARTERY/VENTRICLE ANGIO: CPT | Performed by: INTERNAL MEDICINE

## 2020-05-11 PROCEDURE — 85027 COMPLETE CBC AUTOMATED: CPT

## 2020-05-11 PROCEDURE — 2580000003 HC RX 258: Performed by: INTERNAL MEDICINE

## 2020-05-11 PROCEDURE — 6360000002 HC RX W HCPCS

## 2020-05-11 PROCEDURE — C1769 GUIDE WIRE: HCPCS

## 2020-05-11 PROCEDURE — C1894 INTRO/SHEATH, NON-LASER: HCPCS

## 2020-05-11 PROCEDURE — C1725 CATH, TRANSLUMIN NON-LASER: HCPCS

## 2020-05-11 PROCEDURE — 2580000003 HC RX 258

## 2020-05-11 PROCEDURE — 6370000000 HC RX 637 (ALT 250 FOR IP): Performed by: INTERNAL MEDICINE

## 2020-05-11 PROCEDURE — 80048 BASIC METABOLIC PNL TOTAL CA: CPT

## 2020-05-11 PROCEDURE — 6360000004 HC RX CONTRAST MEDICATION: Performed by: INTERNAL MEDICINE

## 2020-05-11 PROCEDURE — 2709999900 HC NON-CHARGEABLE SUPPLY

## 2020-05-11 PROCEDURE — 2500000003 HC RX 250 WO HCPCS

## 2020-05-11 RX ORDER — SODIUM CHLORIDE 0.9 % (FLUSH) 0.9 %
10 SYRINGE (ML) INJECTION PRN
Status: DISCONTINUED | OUTPATIENT
Start: 2020-05-11 | End: 2020-05-11 | Stop reason: HOSPADM

## 2020-05-11 RX ORDER — NITROGLYCERIN 0.4 MG/1
0.4 TABLET SUBLINGUAL EVERY 5 MIN PRN
Qty: 25 TABLET | Refills: 3 | Status: SHIPPED | OUTPATIENT
Start: 2020-05-11 | End: 2021-03-04

## 2020-05-11 RX ORDER — ASPIRIN 81 MG/1
81 TABLET, CHEWABLE ORAL ONCE
Status: COMPLETED | OUTPATIENT
Start: 2020-05-11 | End: 2020-05-11

## 2020-05-11 RX ORDER — SODIUM CHLORIDE 0.9 % (FLUSH) 0.9 %
10 SYRINGE (ML) INJECTION EVERY 12 HOURS SCHEDULED
Status: DISCONTINUED | OUTPATIENT
Start: 2020-05-11 | End: 2020-05-11 | Stop reason: HOSPADM

## 2020-05-11 RX ORDER — SODIUM CHLORIDE 9 MG/ML
INJECTION, SOLUTION INTRAVENOUS CONTINUOUS
Status: DISCONTINUED | OUTPATIENT
Start: 2020-05-11 | End: 2020-05-11 | Stop reason: HOSPADM

## 2020-05-11 RX ADMIN — IOPAMIDOL 50 ML: 612 INJECTION, SOLUTION INTRAVENOUS at 09:15

## 2020-05-11 RX ADMIN — SODIUM CHLORIDE: 9 INJECTION, SOLUTION INTRAVENOUS at 08:23

## 2020-05-11 RX ADMIN — ASPIRIN 81 MG 81 MG: 81 TABLET ORAL at 08:23

## 2020-05-11 NOTE — PROGRESS NOTES
Pressure dressing removed from right wrist. Quik clot and Tegaderm dressing remains. Site is soft with no hematoma or bleeding noted. Will continue to monitor.

## 2020-05-11 NOTE — PROGRESS NOTES
Patient arrived from procedure right radial site is stable with no bleeding or hematoma noted quick clot is intact.

## 2020-05-11 NOTE — TELEPHONE ENCOUNTER
----- Message from Elisa Shin DO sent at 5/11/2020  1:48 PM EDT -----  Regarding: cath follow up. Patient had cath today and showed mild disease and calcified arteries  Tell him I would like for hime to be placed on ASA 81mg daily, lipitor 20mg daily and losartan 25mg daily to slow down progression of  his CAD. Also needs to see me once a year. Thanks.      Electronically signed by Elisa Shin DO on 5/11/2020 at 1:50 PM

## 2020-05-15 NOTE — TELEPHONE ENCOUNTER
[facility]   [phone] TO DISCUSS NEW MEDS DR MINAYA WANTS YOU TO START TAKING.   NEED TO KNOW WHAT PHARMACY PATIENT WOULD LIKE THESE MEDS CALLED INTO

## 2020-05-21 RX ORDER — ATORVASTATIN CALCIUM 20 MG/1
20 TABLET, FILM COATED ORAL NIGHTLY
Qty: 30 TABLET | Refills: 5 | Status: SHIPPED | OUTPATIENT
Start: 2020-05-21 | End: 2021-03-04

## 2020-05-21 RX ORDER — ASPIRIN 81 MG/1
81 TABLET ORAL DAILY
Qty: 30 TABLET | Refills: 5 | Status: SHIPPED | OUTPATIENT
Start: 2020-05-21 | End: 2021-03-04

## 2020-05-21 RX ORDER — LOSARTAN POTASSIUM 25 MG/1
25 TABLET ORAL DAILY
COMMUNITY
End: 2020-05-21 | Stop reason: SDUPTHER

## 2020-05-21 RX ORDER — ATORVASTATIN CALCIUM 20 MG/1
20 TABLET, FILM COATED ORAL NIGHTLY
COMMUNITY
End: 2020-05-21 | Stop reason: SDUPTHER

## 2020-05-21 RX ORDER — ASPIRIN 81 MG/1
81 TABLET ORAL DAILY
COMMUNITY
End: 2020-05-21 | Stop reason: SDUPTHER

## 2020-05-21 RX ORDER — LOSARTAN POTASSIUM 25 MG/1
25 TABLET ORAL DAILY
Qty: 30 TABLET | Refills: 5 | Status: SHIPPED | OUTPATIENT
Start: 2020-05-21 | End: 2021-03-04

## 2020-06-11 ENCOUNTER — NURSE TRIAGE (OUTPATIENT)
Dept: OTHER | Facility: CLINIC | Age: 55
End: 2020-06-11

## 2020-06-11 ENCOUNTER — OFFICE VISIT (OUTPATIENT)
Dept: FAMILY MEDICINE CLINIC | Age: 55
End: 2020-06-11
Payer: COMMERCIAL

## 2020-06-11 ENCOUNTER — TELEPHONE (OUTPATIENT)
Dept: ADMINISTRATIVE | Age: 55
End: 2020-06-11

## 2020-06-11 VITALS
DIASTOLIC BLOOD PRESSURE: 76 MMHG | OXYGEN SATURATION: 99 % | TEMPERATURE: 98 F | HEART RATE: 74 BPM | SYSTOLIC BLOOD PRESSURE: 102 MMHG

## 2020-06-11 PROCEDURE — 99213 OFFICE O/P EST LOW 20 MIN: CPT | Performed by: NURSE PRACTITIONER

## 2020-06-11 RX ORDER — ALBUTEROL SULFATE 90 UG/1
2 AEROSOL, METERED RESPIRATORY (INHALATION) EVERY 6 HOURS PRN
Qty: 1 INHALER | Refills: 0 | Status: SHIPPED | OUTPATIENT
Start: 2020-06-11

## 2020-06-11 RX ORDER — PREDNISONE 10 MG/1
TABLET ORAL
Qty: 20 TABLET | Refills: 0 | Status: SHIPPED | OUTPATIENT
Start: 2020-06-11 | End: 2021-03-04 | Stop reason: CLARIF

## 2020-06-11 ASSESSMENT — ENCOUNTER SYMPTOMS
DIARRHEA: 0
COUGH: 1
SORE THROAT: 1
SINUS PRESSURE: 0
VOMITING: 0
SHORTNESS OF BREATH: 1
WHEEZING: 1
RHINORRHEA: 0
ABDOMINAL PAIN: 0
BACK PAIN: 0
SINUS PAIN: 0
CONSTIPATION: 0
NAUSEA: 0
CHEST TIGHTNESS: 1

## 2020-06-11 ASSESSMENT — PATIENT HEALTH QUESTIONNAIRE - PHQ9
SUM OF ALL RESPONSES TO PHQ QUESTIONS 1-9: 0
SUM OF ALL RESPONSES TO PHQ9 QUESTIONS 1 & 2: 0
2. FEELING DOWN, DEPRESSED OR HOPELESS: 0
1. LITTLE INTEREST OR PLEASURE IN DOING THINGS: 0
SUM OF ALL RESPONSES TO PHQ QUESTIONS 1-9: 0

## 2020-06-11 NOTE — PROGRESS NOTES
Laterality Date    AMPUTATION Left     ring finger tip    MO COLON CA SCRN NOT HI RSK IND N/A 11/1/2018    COLONOSCOPY performed by Angely Mcginnis MD at [facility]      x4     Family History   Problem Relation Age of Onset    Cancer Mother     Heart Disease Mother        Allergies   Allergen Reactions    Bee Venom      Other reaction(s): Unknown       Current Outpatient Medications on File Prior to Visit   Medication Sig Dispense Refill    atorvastatin (LIPITOR) 20 MG tablet Take 1 tablet by mouth nightly (Patient not taking: Reported on 6/11/2020) 30 tablet 5    losartan (COZAAR) 25 MG tablet Take 1 tablet by mouth daily (Patient not taking: Reported on 6/11/2020) 30 tablet 5    aspirin 81 MG EC tablet Take 1 tablet by mouth daily (Patient not taking: Reported on 6/11/2020) 30 tablet 5    nitroGLYCERIN (NITROSTAT) 0.4 MG SL tablet Place 1 tablet under the tongue every 5 minutes as needed for Chest pain up to max of 3 total doses. If no relief after 1 dose, call 911. (Patient not taking: Reported on 6/11/2020) 25 tablet 3     Current Facility-Administered Medications on File Prior to Visit   Medication Dose Route Frequency Provider Last Rate Last Dose    triamcinolone acetonide (KENALOG-40) injection 80 mg  80 mg Intramuscular Once Bookioo, APRN - CNP        triamcinolone acetonide (KENALOG-40) injection 40 mg  40 mg Intramuscular Once Bookioo, APRN - CNP           Review of Systems   Constitutional: Negative for chills and fever. HENT: Positive for congestion and sore throat (mild sore throat yesterday morning). Negative for ear pain, postnasal drip, rhinorrhea, sinus pressure and sinus pain. Respiratory: Positive for cough, chest tightness, shortness of breath and wheezing. Cardiovascular: Negative for chest pain. Left rib pain   Gastrointestinal: Negative for abdominal pain, constipation, diarrhea, nausea and vomiting. (90 Base) MCG/ACT inhaler; Inhale 2 puffs into the lungs every 6 hours as needed for Wheezing  -     XR CHEST STANDARD (2 VW); Future  -     COVID-19 Ambulatory; Future        Return if symptoms worsen or fail to improve, for follow up with PCP. Will obtain chest xray. Patient requesting COVID test even though he does not strongly feel that he has it. Advised to call with any questions or concerns. Side effects and adverse effects of any medication prescribed today, as well as treatment plan/rationale, follow-up care, and result expectations have been discussed with the patient. Expresses understanding and desires to proceed with treatment plan. Discussed signs and symptoms which require immediate follow-up in ED/call to 911. Understanding verbalized. I have reviewed and updated the electronic medical record.     HUONG Nash - CNP

## 2020-11-23 ENCOUNTER — OFFICE VISIT (OUTPATIENT)
Dept: FAMILY MEDICINE CLINIC | Age: 55
End: 2020-11-23

## 2020-11-23 VITALS
WEIGHT: 226 LBS | HEART RATE: 71 BPM | HEIGHT: 71 IN | OXYGEN SATURATION: 98 % | SYSTOLIC BLOOD PRESSURE: 124 MMHG | RESPIRATION RATE: 14 BRPM | BODY MASS INDEX: 31.64 KG/M2 | DIASTOLIC BLOOD PRESSURE: 80 MMHG

## 2020-11-23 PROCEDURE — 20605 DRAIN/INJ JOINT/BURSA W/O US: CPT | Performed by: INTERNAL MEDICINE

## 2020-11-23 RX ADMIN — TRIAMCINOLONE ACETONIDE 40 MG: 40 INJECTION, SUSPENSION INTRA-ARTICULAR; INTRAMUSCULAR at 14:15

## 2020-11-23 ASSESSMENT — ENCOUNTER SYMPTOMS
TROUBLE SWALLOWING: 0
ABDOMINAL PAIN: 0
SINUS PAIN: 0
EYE ITCHING: 0
VOMITING: 0
RECTAL PAIN: 0
EYE REDNESS: 0
DIARRHEA: 0
COUGH: 0
COLOR CHANGE: 0
SINUS PRESSURE: 0
VOICE CHANGE: 0
SORE THROAT: 0
FACIAL SWELLING: 0
NAUSEA: 0
WHEEZING: 0
PHOTOPHOBIA: 0
BACK PAIN: 0
EYE DISCHARGE: 0
APNEA: 0
EYE PAIN: 0
CHEST TIGHTNESS: 0
BLOOD IN STOOL: 0
CONSTIPATION: 0
ABDOMINAL DISTENTION: 0
RHINORRHEA: 0
SHORTNESS OF BREATH: 0

## 2020-11-23 NOTE — PROGRESS NOTES
on file   [employer] needs     Medical: Not on file     Non-medical: Not on file   Tobacco Use    Smoking status: Never Smoker    Smokeless tobacco: Never Used   Substance and Sexual Activity    Alcohol use: No    Drug use: No    Sexual activity: Never   Lifestyle    Physical activity     Days per week: Not on file     Minutes per session: Not on file    Stress: Not on file   Relationships    Social connections     Talks on phone: Not on file     Gets together: Not on file     Attends Jew service: Not on file     Active member of club or organization: Not on file     Attends meetings of clubs or organizations: Not on file     Relationship status: Not on file    Intimate partner violence     Fear of current or ex partner: Not on file     Emotionally abused: Not on file     Physically abused: Not on file     Forced sexual activity: Not on file   Other Topics Concern    Not on file   Social History Narrative    Not on file     Family History   Problem Relation Age of Onset    Cancer Mother     Heart Disease Mother      Allergies   Allergen Reactions    Bee Venom      Other reaction(s): Unknown     Current Outpatient Medications on File Prior to Visit   Medication Sig Dispense Refill    predniSONE (DELTASONE) 10 MG tablet Take 3 tabs for 3 days, then 2 tabs for 3 days, then 1 tab for 3 days, then 1/2 tab for 3 days, then stop.  20 tablet 0    albuterol sulfate  (90 Base) MCG/ACT inhaler Inhale 2 puffs into the lungs every 6 hours as needed for Wheezing 1 Inhaler 0    atorvastatin (LIPITOR) 20 MG tablet Take 1 tablet by mouth nightly (Patient not taking: Reported on 6/11/2020) 30 tablet 5    losartan (COZAAR) 25 MG tablet Take 1 tablet by mouth daily (Patient not taking: Reported on 6/11/2020) 30 tablet 5    aspirin 81 MG EC tablet Take 1 tablet by mouth daily (Patient not taking: Reported on 6/11/2020) 30 tablet 5    nitroGLYCERIN (NITROSTAT) 0.4 MG SL tablet Place 1 tablet under the tongue every 5 minutes as needed for Chest pain up to max of 3 total doses. If no relief after 1 dose, call 911. (Patient not taking: Reported on 6/11/2020) 25 tablet 3     Current Facility-Administered Medications on File Prior to Visit   Medication Dose Route Frequency Provider Last Rate Last Dose    triamcinolone acetonide (KENALOG-40) injection 80 mg  80 mg Intramuscular Once Marcelina Alpers, APRN - CNP        triamcinolone acetonide (KENALOG-40) injection 40 mg  40 mg Intramuscular Once Marcelina Alpers, APRN - DEREK                Review of Systems   Constitutional: Negative for chills, diaphoresis, fatigue and fever. HENT: Negative for congestion, dental problem, drooling, ear discharge, ear pain, facial swelling, hearing loss, mouth sores, nosebleeds, postnasal drip, rhinorrhea, sinus pressure, sinus pain, sneezing, sore throat, tinnitus, trouble swallowing and voice change. Eyes: Negative for photophobia, pain, discharge, redness, itching and visual disturbance. Respiratory: Negative for apnea, cough, chest tightness, shortness of breath and wheezing. Cardiovascular: Positive for chest pain. Negative for palpitations and leg swelling. Gastrointestinal: Negative for abdominal distention, abdominal pain, blood in stool, constipation, diarrhea, nausea, rectal pain and vomiting. Endocrine: Negative for cold intolerance, heat intolerance, polydipsia, polyphagia and polyuria. Genitourinary: Negative for decreased urine volume, difficulty urinating, dysuria, flank pain, frequency, genital sores, hematuria and urgency. Musculoskeletal: Negative for arthralgias, back pain, gait problem, joint swelling, myalgias, neck pain and neck stiffness. Skin: Negative for color change, rash and wound. Allergic/Immunologic: Negative for environmental allergies and food allergies.    Neurological: Negative for dizziness, tremors, seizures, syncope, facial asymmetry, speech difficulty, weakness, light-headedness, numbness and headaches. Hematological: Negative for adenopathy. Does not bruise/bleed easily. Psychiatric/Behavioral: Negative for agitation, confusion, decreased concentration, hallucinations, self-injury, sleep disturbance and suicidal ideas. The patient is not nervous/anxious. Objective:   /80   Pulse 71   Resp 14   Ht 5' 11\" (1.803 m)   Wt 226 lb (102.5 kg)   SpO2 98%   BMI 31.52 kg/m²   Physical Exam  Constitutional:       General: He is not in acute distress. Appearance: He is well-developed. HENT:      Head: Normocephalic. Right Ear: External ear normal.      Left Ear: External ear normal.   Eyes:      Conjunctiva/sclera: Conjunctivae normal.   Neck:      Musculoskeletal: Neck supple. Vascular: No JVD. Trachea: No tracheal deviation. Cardiovascular:      Rate and Rhythm: Normal rate and regular rhythm. Heart sounds: Normal heart sounds. Pulmonary:      Effort: Pulmonary effort is normal. No respiratory distress. Breath sounds: Normal breath sounds. No wheezing or rales. Chest:      Chest wall: No tenderness. Abdominal:      General: Bowel sounds are normal. There is no distension. Palpations: Abdomen is soft. There is no mass. Tenderness: There is no abdominal tenderness. There is no guarding or rebound. Musculoskeletal:         General: No tenderness or deformity. Comments: Right elbow without evidence of erythema, but exquisitely tender to palpation. Kenalog and viscous lidocaine infused into site of pain without complications. Lymphadenopathy:      Cervical: No cervical adenopathy. Skin:     General: Skin is warm and dry. Coloration: Skin is not pale. Findings: No erythema or rash. Neurological:      Mental Status: He is alert and oriented to person, place, and time. Motor: No abnormal muscle tone. Psychiatric:         Thought Content:  Thought content normal.         Judgment: Judgment normal.           Assessment:       Diagnosis Orders   1. Right elbow pain           Plan:      Kelley Marquez was seen today for elbow pain. Diagnoses and all orders for this visit:    Right elbow pain    Follow-up with [facility]. No follow-ups on file. Nesha Morrissey     Please note, this report has been partially produced using speech recognition software  and may cause  and /or contain errors related to that system including grammar, punctuation and spelling as well as words and phrases that may seem inappropriate. If there are questions or concerns please feel free to contact me to clarify.

## 2020-12-14 RX ORDER — TRIAMCINOLONE ACETONIDE 40 MG/ML
40 INJECTION, SUSPENSION INTRA-ARTICULAR; INTRAMUSCULAR ONCE
Status: COMPLETED | OUTPATIENT
Start: 2020-12-14 | End: 2020-11-23

## 2021-01-25 DIAGNOSIS — Z20.822 SUSPECTED COVID-19 VIRUS INFECTION: Primary | ICD-10-CM

## 2021-03-04 ENCOUNTER — OFFICE VISIT (OUTPATIENT)
Dept: FAMILY MEDICINE CLINIC | Age: 56
End: 2021-03-04
Payer: COMMERCIAL

## 2021-03-04 VITALS
HEIGHT: 71 IN | HEART RATE: 80 BPM | RESPIRATION RATE: 15 BRPM | DIASTOLIC BLOOD PRESSURE: 86 MMHG | BODY MASS INDEX: 31.92 KG/M2 | WEIGHT: 228 LBS | SYSTOLIC BLOOD PRESSURE: 138 MMHG

## 2021-03-04 DIAGNOSIS — M25.521 RIGHT ELBOW PAIN: Primary | ICD-10-CM

## 2021-03-04 DIAGNOSIS — M77.02 MEDIAL EPICONDYLITIS OF LEFT ELBOW: ICD-10-CM

## 2021-03-04 PROCEDURE — 20551 NJX 1 TENDON ORIGIN/INSJ: CPT | Performed by: NURSE PRACTITIONER

## 2021-03-04 PROCEDURE — 99214 OFFICE O/P EST MOD 30 MIN: CPT | Performed by: NURSE PRACTITIONER

## 2021-03-04 RX ORDER — BUPIVACAINE HYDROCHLORIDE 2.5 MG/ML
30 INJECTION, SOLUTION INFILTRATION; PERINEURAL ONCE
Status: COMPLETED | OUTPATIENT
Start: 2021-03-04 | End: 2021-03-04

## 2021-03-04 RX ORDER — LIDOCAINE HYDROCHLORIDE 10 MG/ML
1 INJECTION, SOLUTION EPIDURAL; INFILTRATION; INTRACAUDAL; PERINEURAL ONCE
Status: COMPLETED | OUTPATIENT
Start: 2021-03-04 | End: 2021-03-04

## 2021-03-04 RX ORDER — METHYLPREDNISOLONE ACETATE 40 MG/ML
40 INJECTION, SUSPENSION INTRA-ARTICULAR; INTRALESIONAL; INTRAMUSCULAR; SOFT TISSUE ONCE
Status: COMPLETED | OUTPATIENT
Start: 2021-03-04 | End: 2021-03-04

## 2021-03-04 RX ADMIN — METHYLPREDNISOLONE ACETATE 40 MG: 40 INJECTION, SUSPENSION INTRA-ARTICULAR; INTRALESIONAL; INTRAMUSCULAR; SOFT TISSUE at 14:01

## 2021-03-04 RX ADMIN — BUPIVACAINE HYDROCHLORIDE 75 MG: 2.5 INJECTION, SOLUTION INFILTRATION; PERINEURAL at 13:58

## 2021-03-04 RX ADMIN — LIDOCAINE HYDROCHLORIDE 1 ML: 10 INJECTION, SOLUTION EPIDURAL; INFILTRATION; INTRACAUDAL; PERINEURAL at 13:59

## 2021-03-04 ASSESSMENT — PATIENT HEALTH QUESTIONNAIRE - PHQ9
SUM OF ALL RESPONSES TO PHQ QUESTIONS 1-9: 0
2. FEELING DOWN, DEPRESSED OR HOPELESS: 0
1. LITTLE INTEREST OR PLEASURE IN DOING THINGS: 0

## 2021-03-10 ENCOUNTER — TELEPHONE (OUTPATIENT)
Dept: FAMILY MEDICINE CLINIC | Age: 56
End: 2021-03-10

## 2021-03-10 NOTE — TELEPHONE ENCOUNTER
Patient dropped off employee physical form for Dr. Gavino Singh to complete and sign. Please contact patient at [phone] when form is ready to be picked up. Form is scanned in and attached to this encounter, and hard copy is in Dr. Dolores Dickson.

## 2021-03-10 NOTE — TELEPHONE ENCOUNTER
Le Ku says the injection for his elbow did help, but it is only about 70 % better. He is asking if there is anything else that can be done to help? Is he able to get another injection?

## 2021-03-11 RX ORDER — NAPROXEN 500 MG/1
500 TABLET ORAL 2 TIMES DAILY WITH MEALS
Qty: 60 TABLET | Refills: 0 | Status: SHIPPED | OUTPATIENT
Start: 2021-03-11 | End: 2021-05-20

## 2021-03-11 NOTE — PROGRESS NOTES
Elbow Injection Procedure Note    Pre-operative Diagnosis: left elbow tendonitis    Post-operative Diagnosis: same    Indications: Symptom relief    Anesthesia: Lidocaine 1% without epinephrine with added sodium bicarbonate    Procedure Details     Verbal consent was obtained for the procedure. The point of maximum tenderness was identified and marked. The skin prepped with Betadine and a small wheel of anesthetic was injected into the subcutaneous tissue. A needle was advanced into the left elbow joint through the radio-capitellar joint space without difficulty. 0 ml of na fluid was removed. After the fluid was removed, 1 ml of depomedrol 40mg, 1 ML 1% lidocaine, 1 ML 0.25% bupivicaine  was injected into the elbow. Complications:  None; patient tolerated the procedure well.       Will follow up PRN  Tylenol PRN for pain  S&S of infection reviewed    Electronically signed by:  ABDIRASHID Hernandez, FNP-C 3/11/21 10:21 AM

## 2021-03-11 NOTE — TELEPHONE ENCOUNTER
I sent him some naproxen to take for the next month hopefully the shot will still kick in and he'll get more relief

## 2021-03-12 ENCOUNTER — TELEPHONE (OUTPATIENT)
Dept: FAMILY MEDICINE CLINIC | Age: 56
End: 2021-03-12

## 2021-03-12 NOTE — TELEPHONE ENCOUNTER
I spoke to the patient he picked up his physical form from the  and he said he will bring a copy by for our records

## 2021-04-20 NOTE — TELEPHONE ENCOUNTER
No copy of completed form in . Additional encounter shows that the patient picked up the form and was contacted to bring a copy of it to us for our records. Closing this encounter.

## 2021-05-20 ENCOUNTER — NURSE TRIAGE (OUTPATIENT)
Dept: OTHER | Facility: CLINIC | Age: 56
End: 2021-05-20

## 2021-05-20 ENCOUNTER — OFFICE VISIT (OUTPATIENT)
Dept: FAMILY MEDICINE CLINIC | Age: 56
End: 2021-05-20
Payer: COMMERCIAL

## 2021-05-20 VITALS
SYSTOLIC BLOOD PRESSURE: 110 MMHG | DIASTOLIC BLOOD PRESSURE: 66 MMHG | RESPIRATION RATE: 14 BRPM | OXYGEN SATURATION: 94 % | BODY MASS INDEX: 31.64 KG/M2 | HEART RATE: 82 BPM | WEIGHT: 226 LBS | HEIGHT: 71 IN

## 2021-05-20 DIAGNOSIS — M25.521 RIGHT ELBOW PAIN: Primary | ICD-10-CM

## 2021-05-20 PROCEDURE — 99214 OFFICE O/P EST MOD 30 MIN: CPT | Performed by: INTERNAL MEDICINE

## 2021-05-20 RX ORDER — MELOXICAM 15 MG/1
15 TABLET ORAL DAILY
Qty: 30 TABLET | Refills: 3 | Status: SHIPPED | OUTPATIENT
Start: 2021-05-20 | End: 2021-05-21 | Stop reason: SDUPTHER

## 2021-05-20 SDOH — ECONOMIC STABILITY: FOOD INSECURITY: WITHIN THE PAST 12 MONTHS, YOU WORRIED THAT YOUR FOOD WOULD RUN OUT BEFORE YOU GOT MONEY TO BUY MORE.: NEVER TRUE

## 2021-05-20 SDOH — ECONOMIC STABILITY: FOOD INSECURITY: WITHIN THE PAST 12 MONTHS, THE FOOD YOU BOUGHT JUST DIDN'T LAST AND YOU DIDN'T HAVE MONEY TO GET MORE.: NEVER TRUE

## 2021-05-20 ASSESSMENT — ENCOUNTER SYMPTOMS
VOICE CHANGE: 0
ABDOMINAL DISTENTION: 0
BLOOD IN STOOL: 0
EYE REDNESS: 0
EYE ITCHING: 0
APNEA: 0
WHEEZING: 0
EYE DISCHARGE: 0
TROUBLE SWALLOWING: 0
EYE PAIN: 0
NAUSEA: 0
FACIAL SWELLING: 0
BACK PAIN: 0
CHEST TIGHTNESS: 0
ABDOMINAL PAIN: 0
COUGH: 0
SHORTNESS OF BREATH: 0
SINUS PRESSURE: 0
SORE THROAT: 0
VOMITING: 0
DIARRHEA: 0
RHINORRHEA: 0
RECTAL PAIN: 0
PHOTOPHOBIA: 0
SINUS PAIN: 0
COLOR CHANGE: 0
CONSTIPATION: 0

## 2021-05-20 ASSESSMENT — SOCIAL DETERMINANTS OF HEALTH (SDOH): HOW HARD IS IT FOR YOU TO PAY FOR THE VERY BASICS LIKE FOOD, HOUSING, MEDICAL CARE, AND HEATING?: NOT HARD AT ALL

## 2021-05-20 NOTE — TELEPHONE ENCOUNTER
Received call from Franki [facility] [employer] [facility]. Brief description of triage: Severe Right elbow pain    Triage indicates for patient to be seen today. Care advice provided, patient verbalizes understanding; denies any other questions or concerns; instructed to call back for any new or worsening symptoms. Writer provided warm transfer to Cely Anderson at [facility] for appointment scheduling. Attention Provider: Thank you for allowing me to participate in the care of your patient. The patient was connected to triage in response to information provided to the [facility]. Please do not respond through this encounter as the response is not directed to a shared pool. Reason for Disposition   SEVERE pain (e.g., excruciating, unable to do any normal activities)    Answer Assessment - Initial Assessment Questions  1. ONSET: \"When did the pain start? \"      10 days  2. LOCATION: \"Where is the pain located? \"     \"chronic right elbow pain\" , hx of cortisone injections with good effect. 3. PAIN: \"How bad is the pain? \" (Scale 1-10; or mild, moderate, severe)  - MILD - doesn't interfere with normal activities  - MODERATE - interferes with normal activities (e.g., work or school) or awakens from sleep  - SEVERE - excruciating pain, unable to do use arm at all    immobile 2/10, with use 7/10\"  4. WORK OR EXERCISE: \"Has there been any recent work or exercise that involved this part of the body? \"     Daily use  5. CAUSE: \"What do you think is causing the elbow pain? \"    \"chronic symptom\"  6. OTHER SYMPTOMS: \"Do you have any other symptoms? \" (e.g., neck pain, elbow swelling, rash, fever)      \"mild edema\"  7. PREGNANCY: \"Is there any chance you are pregnant? \" \"When was your last menstrual period? \"     NA    Protocols used: ELBOW PAIN-ADULT-[place]

## 2021-05-20 NOTE — PROGRESS NOTES
Subjective:      Patient ID: Abner Jc is a 50 y.o. male who presents today for:  Chief Complaint   Patient presents with    Elbow Pain     rt elbow pain pt states hes here for cortisone injection     This virtual encounter is being conducted to reduce the spread of coronavirus and reduce the morbidity and mortality risk of exposure related to the virus. HPI  82-year-old male presents with a complaint of chest pain . Right elbow pain/LATERAL ASPECT: Pt has been experiencing pain again. No fever, warmth or redness. Right lateral elbow injected with Kenalog/lidocaine mixture. The patient tolerated the procedure without complication. Physician Dr. Vince Logan at the bedside during the procedure. Erectile dysfunction:  The past the patient reported   decreased libido and erectile dysfunction. The symptoms have been present for approximately 6 months. Hyperpigmented macules. The Patient was referred to dermatology regarding this complaint at the previous visit. At present he denies polyuria,  Polydipsia, constitutional, sinus, visual, additional cardio pulmonary, gastrointestinal, immunological, musculoskeletal, neurologic, hematologic, or psychiatric complaints.         Past Medical History:   Diagnosis Date    Atypical chest pain, Dr Sharona Shaw, neg GXT, 2014 10/7/2015    COPD (chronic obstructive pulmonary disease) ([address]     Emphysema of lung ([address]     Health maintenance examination 10/7/2015    Left nephrolithiasis 10/7/2015    Lumbago 4/27/2004    Medial epicondylitis of left elbow, Drummer, RN 10/4/2018    Shortness of breath 10/7/2015     Past Surgical History:   Procedure Laterality Date    AMPUTATION Left     ring finger tip    [place] COLON CA SCRN NOT HI RSK IND N/A 11/1/2018    COLONOSCOPY performed by Cheikh Ramsey MD at [facility]      x4     Social History     Socioeconomic History    Marital status: Single     Spouse name: Not on file  Number of children: Not on file    Years of education: Not on file    Highest education level: Not on file   Occupational History    Not on file   Tobacco Use    Smoking status: Never Smoker    Smokeless tobacco: Never Used   Vaping Use    Vaping Use: Never used   Substance and Sexual Activity    Alcohol use: No    Drug use: No    Sexual activity: Never   Other Topics Concern    Not on file   Social History Narrative    Not on file     Social Determinants of Health     Financial Resource Strain: Low Risk     Difficulty of Paying Living Expenses: Not hard at all   Food Insecurity: No Food Insecurity    Worried About [address] [employer] in the Last Year: Never true    [facility] in the Last Year: Never true   Transportation Needs:     Lack of Transportation (Medical):      Lack of Transportation (Non-Medical):    Physical Activity:     Days of Exercise per Week:     Minutes of Exercise per Session:    Stress:     Feeling of Stress :    Social Connections:     Frequency of Communication with Friends and Family:     Frequency of Social Gatherings with Friends and Family:     Attends Jainism Services:     Active Member of Clubs or Organizations:     Attends Club or Organization Meetings:     Marital Status:    Intimate Partner Violence:     Fear of Current or Ex-Partner:     Emotionally Abused:     Physically Abused:     Sexually Abused:      Family History   Problem Relation Age of Onset    Cancer Mother     Heart Disease Mother      Allergies   Allergen Reactions    Bee Venom      Other reaction(s): Unknown     Current Outpatient Medications on File Prior to Visit   Medication Sig Dispense Refill    albuterol sulfate  (90 Base) MCG/ACT inhaler Inhale 2 puffs into the lungs every 6 hours as needed for Wheezing 1 Inhaler 0     Current Facility-Administered Medications on File Prior to Visit   Medication Dose Route Frequency Provider Last Rate Last Admin    triamcinolone Daphnie Cm MD     Please note, this report has been partially produced using speech recognition software  and may cause  and /or contain errors related to that system including grammar, punctuation and spelling as well as words and phrases that may seem inappropriate. If there are questions or concerns please feel free to contact me to clarify.

## 2021-05-21 RX ORDER — MELOXICAM 15 MG/1
15 TABLET ORAL DAILY
Qty: 30 TABLET | Refills: 3 | Status: SHIPPED | OUTPATIENT
Start: 2021-05-21 | End: 2022-02-01

## 2021-07-02 DIAGNOSIS — K64.4 EXTERNAL HEMORRHOID: Primary | ICD-10-CM

## 2021-07-09 ENCOUNTER — OFFICE VISIT (OUTPATIENT)
Dept: SURGERY | Age: 56
End: 2021-07-09
Payer: COMMERCIAL

## 2021-07-09 VITALS
TEMPERATURE: 96.4 F | HEART RATE: 63 BPM | OXYGEN SATURATION: 98 % | HEIGHT: 71 IN | BODY MASS INDEX: 31.64 KG/M2 | WEIGHT: 226 LBS

## 2021-07-09 DIAGNOSIS — K64.5 THROMBOSED HEMORRHOIDS: Primary | ICD-10-CM

## 2021-07-09 PROCEDURE — 99999 PR OFFICE/OUTPT VISIT,PROCEDURE ONLY: CPT | Performed by: COLON & RECTAL SURGERY

## 2021-07-09 PROCEDURE — 46320 REMOVAL OF HEMORRHOID CLOT: CPT | Performed by: COLON & RECTAL SURGERY

## 2021-07-09 RX ORDER — OXYCODONE HYDROCHLORIDE AND ACETAMINOPHEN 5; 325 MG/1; MG/1
1 TABLET ORAL EVERY 6 HOURS PRN
Qty: 5 TABLET | Refills: 0 | Status: SHIPPED | OUTPATIENT
Start: 2021-07-09 | End: 2021-07-12

## 2021-07-09 ASSESSMENT — ENCOUNTER SYMPTOMS
RECTAL PAIN: 1
CHEST TIGHTNESS: 0
DIARRHEA: 0
COLOR CHANGE: 0
ABDOMINAL PAIN: 0
ANAL BLEEDING: 0
BLOOD IN STOOL: 0
SHORTNESS OF BREATH: 0
CONSTIPATION: 0

## 2021-07-09 NOTE — PROGRESS NOTES
Subjective:      Patient ID: Agueda Guillen is a 50 y.o. male who presents for:  Chief Complaint   Patient presents with    New Patient       This is a 65-year-old male who has had perianal pain and a lump present at his anus for the past 6 weeks if not longer. He had a colonoscopy 3 years ago which was unremarkable. He denies rectal bleeding. He denies abdominal pain or any unintentional weight loss.       Past Medical History:   Diagnosis Date    Atypical chest pain, Dr Ginger Messina, neg GXT, 2014 10/7/2015    [facility] [address]     Emphysema of lung ([facility] [address]     Health maintenance examination 10/7/2015    Left nephrolithiasis 10/7/2015    Lumbago 4/27/2004    Medial epicondylitis of left elbow, Drummer, RN 10/4/2018    Shortness of breath 10/7/2015     Past Surgical History:   Procedure Laterality Date    AMPUTATION Left     ring finger tip    DE COLON CA SCRN NOT HI RSK IND N/A 11/1/2018    COLONOSCOPY performed by Gilson Gambino MD at [facility]      x4     Social History     Socioeconomic History    Marital status: Single     Spouse name: Not on file    Number of children: Not on file    Years of education: Not on file    Highest education level: Not on file   Occupational History    Not on file   Tobacco Use    Smoking status: Never Smoker    Smokeless tobacco: Never Used   Vaping Use    Vaping Use: Never used   Substance and Sexual Activity    Alcohol use: No    Drug use: No    Sexual activity: Never   Other Topics Concern    Not on file   Social History Narrative    Not on file     Social Determinants of Health     Financial Resource Strain: Low Risk     Difficulty of Paying Living Expenses: Not hard at all   Food Insecurity: No Food Insecurity    Worried About [facility] in the Last Year: Never true    Emmett [employer] Food in the Last Year: Never true   Transportation Needs:     Lack of Transportation (Medical):  Lack of Transportation (Non-Medical):    Physical Activity:     Days of Exercise per Week:     Minutes of Exercise per Session:    Stress:     Feeling of Stress :    Social Connections:     Frequency of Communication with Friends and Family:     Frequency of Social Gatherings with Friends and Family:     Attends Congregational Services:     Active Member of Clubs or Organizations:     Attends Club or Organization Meetings:     Marital Status:    Intimate Partner Violence:     Fear of Current or Ex-Partner:     Emotionally Abused:     Physically Abused:     Sexually Abused:      Family History   Problem Relation Age of Onset    Cancer Mother     Heart Disease Mother      Allergies:  Bee venom    Review of Systems   Constitutional: Negative for activity change, appetite change and unexpected weight change. HENT: Negative for congestion. Respiratory: Negative for chest tightness and shortness of breath. Cardiovascular: Negative for chest pain. Gastrointestinal: Positive for rectal pain. Negative for abdominal pain, anal bleeding, blood in stool, constipation and diarrhea. Genitourinary: Negative for difficulty urinating. Musculoskeletal: Negative for arthralgias. Skin: Negative for color change. Neurological: Negative for dizziness and headaches. Hematological: Does not bruise/bleed easily. Psychiatric/Behavioral: Negative for agitation. Objective:    Pulse 63   Temp 96.4 °F (35.8 °C) (Temporal)   Ht 5' 11\" (1.803 m)   Wt 226 lb (102.5 kg)   SpO2 98%   BMI 31.52 kg/m²     Physical Exam  Constitutional:       General: He is not in acute distress. Appearance: He is well-developed. He is not diaphoretic. HENT:      Head: Normocephalic and atraumatic. Eyes:      Pupils: Pupils are equal, round, and reactive to light. Cardiovascular:      Rate and Rhythm: Normal rate and regular rhythm. Heart sounds: Normal heart sounds.    Pulmonary:      Effort: Pulmonary effort is normal. No respiratory distress. Breath sounds: Normal breath sounds. No wheezing. Abdominal:      General: There is no distension. Palpations: Abdomen is soft. Tenderness: There is no abdominal tenderness. There is no guarding. Genitourinary:     Comments: He has a thrombosed hemorrhoid present left lateral anal verge. Moderate in size. No signs of infection. No evidence of anal fissure. Digital exam otherwise unremarkable. Musculoskeletal:         General: No tenderness. Normal range of motion. Cervical back: Normal range of motion. Skin:     General: Skin is warm and dry. Findings: No erythema or rash. Neurological:      Mental Status: He is alert and oriented to person, place, and time. Psychiatric:         Behavior: Behavior normal.         Thought Content: Thought content normal.         Judgment: Judgment normal.       Procedure note: I discussed the risks and benefits of excision of thrombosed hemorrhoid under local analgesia. Risks of infection, bleeding, postoperative pain and recurrence discussed. He wishes to proceed. Consent obtained. Medication and allergies reviewed. The area was prepped and draped with a Betadine-containing solution. 1% lidocaine was injected for local analgesia. The hemorrhoid was excised completely with scissors. The Hyfrecator was used for hemostasis. Dressings were applied. Estimated blood loss 10 cc. Assessment/Plan:          Diagnosis Orders   1. Thrombosed hemorrhoids       Post excision instructions were given. Analgesia was given. He will return back to the office in 1 to 2 weeks if any problems persist.  All questions answered. Please note this report has beenpartially produced using speech recognition software and may cause contain errors related to that system including grammar, punctuation and spelling as well as words and phrases that may seem inappropriate.  If there arequestions or concerns please feel free to contact me to clarify

## 2021-07-16 ENCOUNTER — TELEPHONE (OUTPATIENT)
Dept: ORTHOPEDIC SURGERY | Age: 56
End: 2021-07-16

## 2021-07-28 ENCOUNTER — OFFICE VISIT (OUTPATIENT)
Dept: FAMILY MEDICINE CLINIC | Age: 56
End: 2021-07-28
Payer: COMMERCIAL

## 2021-07-28 VITALS
WEIGHT: 226 LBS | HEIGHT: 71 IN | HEART RATE: 74 BPM | BODY MASS INDEX: 31.64 KG/M2 | TEMPERATURE: 97.6 F | SYSTOLIC BLOOD PRESSURE: 122 MMHG | DIASTOLIC BLOOD PRESSURE: 82 MMHG | OXYGEN SATURATION: 94 %

## 2021-07-28 DIAGNOSIS — L25.5 DERMATITIS DUE TO PLANT: Primary | ICD-10-CM

## 2021-07-28 PROCEDURE — 99213 OFFICE O/P EST LOW 20 MIN: CPT | Performed by: PHYSICIAN ASSISTANT

## 2021-07-28 PROCEDURE — 96372 THER/PROPH/DIAG INJ SC/IM: CPT | Performed by: PHYSICIAN ASSISTANT

## 2021-07-28 RX ORDER — PREDNISONE 10 MG/1
TABLET ORAL
Qty: 20 TABLET | Refills: 0 | Status: SHIPPED | OUTPATIENT
Start: 2021-07-28 | End: 2022-02-01

## 2021-07-28 RX ORDER — TRIAMCINOLONE ACETONIDE 40 MG/ML
40 INJECTION, SUSPENSION INTRA-ARTICULAR; INTRAMUSCULAR ONCE
Status: COMPLETED | OUTPATIENT
Start: 2021-07-28 | End: 2021-07-28

## 2021-07-28 RX ADMIN — TRIAMCINOLONE ACETONIDE 40 MG: 40 INJECTION, SUSPENSION INTRA-ARTICULAR; INTRAMUSCULAR at 16:44

## 2021-07-28 ASSESSMENT — ENCOUNTER SYMPTOMS: COLOR CHANGE: 1

## 2021-07-28 NOTE — PROGRESS NOTES
Subjective  Cherylene Frost, 50 y.o. male presents today with:  Chief Complaint   Patient presents with   [facility]     Pt here for poison ivy. On legs, arms, face. Itchy. Applies calomine lotion which helps soothes it. HPI  Patient is here along with his wife being seen acutely for complaint of poison ivy that has been spreading on his arms and legs also with  new lesions on his face and groin since exposure a week ago denies scratchy throat shortness of breath wheezing    Review of Systems   Skin: Positive for color change and rash. All other systems reviewed and are negative.         Past Medical History:   Diagnosis Date    Atypical chest pain, Dr Richard Ortiz, neg GXT, 2014 10/7/2015    [facility] [address]     Emphysema [facility] lung ([facility] [address]     Health maintenance examination 10/7/2015    Left nephrolithiasis 10/7/2015    Lumbago 4/27/2004    Medial epicondylitis of left elbow, Drummer, RN 10/4/2018    Shortness of breath 10/7/2015     Past Surgical History:   Procedure Laterality Date    AMPUTATION Left     ring finger tip    [place] COLON CA SCRN NOT HI RSK IND N/A 11/1/2018    COLONOSCOPY performed by Cedric Bains MD at [facility]      x4     Social History     Socioeconomic History    Marital status: Single     Spouse name: Not on file    Number of children: Not on file    Years of education: Not on file    Highest education level: Not on file   Occupational History    Not on file   Tobacco Use    Smoking status: Never Smoker    Smokeless tobacco: Never Used   Vaping Use    Vaping Use: Never used   Substance and Sexual Activity    Alcohol use: No    Drug use: No    Sexual activity: Never   Other Topics Concern    Not on file   Social History Narrative    Not on file     Social Determinants of Health     Financial Resource Strain: Low Risk     Difficulty of Paying Living Expenses: Not hard at all   Food Insecurity: No Food Insecurity    Worried About Running Out of Food in the Last Year: Never true    Emmett of Food in the Last Year: Never true   Transportation Needs:     Lack of Transportation (Medical):  Lack of Transportation (Non-Medical):    Physical Activity:     Days of Exercise per Week:     Minutes of Exercise per Session:    Stress:     Feeling of Stress :    Social Connections:     Frequency of Communication with Friends and Family:     Frequency of Social Gatherings with Friends and Family:     Attends Taoism Services:     Active Member of Clubs or Organizations:     Attends Club or Organization Meetings:     Marital Status:    Intimate Partner Violence:     Fear of Current or Ex-Partner:     Emotionally Abused:     Physically Abused:     Sexually Abused:      Family History   Problem Relation Age of Onset    Cancer Mother     Heart Disease Mother         Allergies   Allergen Reactions    Bee Venom      Other reaction(s): Unknown     Current Outpatient Medications   Medication Sig Dispense Refill    predniSONE (DELTASONE) 10 MG tablet Take 3 tabs for 3 days, then 2 tabs for 3 days, then 1 tab for 3 days, then 1/2 tab for 3 days, then stop. 20 tablet 0    albuterol sulfate  (90 Base) MCG/ACT inhaler Inhale 2 puffs into the lungs every 6 hours as needed for Wheezing 1 Inhaler 0    meloxicam (MOBIC) 15 MG tablet Take 1 tablet by mouth daily 30 tablet 3     Current Facility-Administered Medications   Medication Dose Route Frequency Provider Last Rate Last Admin    triamcinolone acetonide (KENALOG-40) injection 40 mg  40 mg Intramuscular Once [email or web address], APRN - CNP           Objective    Vitals:    07/28/21 1550   BP: 122/82   Pulse: 74   Temp: 97.6 °F (36.4 °C)   TempSrc: Oral   SpO2: 94%   Weight: 226 lb (102.5 kg)   Height: 5' 11\" (1.803 m)     Physical Exam  Constitutional:       Appearance: Normal appearance. HENT:      Head: Normocephalic and atraumatic.       Right Ear: External ear normal.      Left Ear: External ear normal.      Nose: Nose normal.   Eyes:      Extraocular Movements: Extraocular movements intact. Conjunctiva/sclera: Conjunctivae normal.   Cardiovascular:      Rate and Rhythm: Normal rate and regular rhythm. Pulses: Normal pulses. Pulmonary:      Effort: No respiratory distress. Breath sounds: No wheezing. Musculoskeletal:      Cervical back: Normal range of motion and neck supple. Lymphadenopathy:      Cervical: No cervical adenopathy. Skin:     General: Skin is warm and dry. Findings: Rash present. Comments: Multiple tattoos   Neurological:      Mental Status: He is alert and oriented to person, place, and time. Psychiatric:         Mood and Affect: Mood normal.         Behavior: Behavior normal.         Thought Content: Thought content normal.         Judgment: Judgment normal.              Assessment & Plan    Diagnosis Orders   1. Dermatitis due to plant           No orders of the defined types were placed in this encounter. Orders Placed This Encounter   Medications    predniSONE (DELTASONE) 10 MG tablet     Sig: Take 3 tabs for 3 days, then 2 tabs for 3 days, then 1 tab for 3 days, then 1/2 tab for 3 days, then stop. Dispense:  20 tablet     Refill:  0    triamcinolone acetonide (KENALOG-40) injection 40 mg     There are no discontinued medications. Return for follow up with your PCP as directed.     Anu Jung PA-C

## 2021-08-06 ENCOUNTER — OFFICE VISIT (OUTPATIENT)
Dept: SURGERY | Age: 56
End: 2021-08-06
Payer: COMMERCIAL

## 2021-08-06 VITALS
WEIGHT: 226 LBS | HEIGHT: 71 IN | BODY MASS INDEX: 31.64 KG/M2 | TEMPERATURE: 96.8 F | OXYGEN SATURATION: 98 % | HEART RATE: 68 BPM

## 2021-08-06 DIAGNOSIS — K64.5 THROMBOSED HEMORRHOIDS: Primary | ICD-10-CM

## 2021-08-06 PROCEDURE — 99213 OFFICE O/P EST LOW 20 MIN: CPT | Performed by: COLON & RECTAL SURGERY

## 2021-08-06 ASSESSMENT — ENCOUNTER SYMPTOMS
ANAL BLEEDING: 0
CHEST TIGHTNESS: 0
ABDOMINAL PAIN: 0
DIARRHEA: 0
CONSTIPATION: 0

## 2021-08-06 NOTE — PROGRESS NOTES
Subjective:      Patient ID: Mara Mejia is a 50 y.o. male who presents for:  Chief Complaint   Patient presents with    Follow-up       He returns to the office in follow-up after excision of a thrombosed hemorrhoid. This was about 1 month ago. He was having some bleeding afterwards while the excision site healed. Over the past 7 days he has been no bleeding. He was concerned about a little discomfort in the area but is otherwise been doing well. His bowels are working. Denies diarrhea or constipation. Denies any abdominal pain or unintentional weight loss. His last colonoscopy was at the age of 50.       Past Medical History:   Diagnosis Date    Atypical chest pain, Dr Myesha Iniguez, neg GXT, 2014 10/7/2015    [facility] [address]     Emphysema of lung ([facility] [address]     Health maintenance examination 10/7/2015    Left nephrolithiasis 10/7/2015    Lumbago 4/27/2004    Medial epicondylitis of left elbow, Drummer, RN 10/4/2018    Shortness of breath 10/7/2015     Past Surgical History:   Procedure Laterality Date    AMPUTATION Left     ring finger tip    [place] COLON CA SCRN NOT HI RSK IND N/A 11/1/2018    COLONOSCOPY performed by Jackie Mcdonald MD at [facility]      x4     Social History     Socioeconomic History    Marital status: Single     Spouse name: Not on file    Number of children: Not on file    Years of education: Not on file    Highest education level: Not on file   Occupational History    Not on file   Tobacco Use    Smoking status: Never Smoker    Smokeless tobacco: Never Used   Vaping Use    Vaping Use: Never used   Substance and Sexual Activity    Alcohol use: No    Drug use: No    Sexual activity: Never   Other Topics Concern    Not on file   Social History Narrative    Not on file     Social Determinants of Health     Financial Resource Strain: Low Risk     Difficulty of Paying Living Expenses: Not hard at all   Food Insecurity: No Food Insecurity    Worried About Running Out of Food in the Last Year: Never true    Ran Out of Food in the Last Year: Never true   Transportation Needs:     Lack of Transportation (Medical):  Lack of Transportation (Non-Medical):    Physical Activity:     Days of Exercise per Week:     Minutes of Exercise per Session:    Stress:     Feeling of Stress :    Social Connections:     Frequency of Communication with Friends and Family:     Frequency of Social Gatherings with Friends and Family:     Attends Druze Services:     Active Member of Clubs or Organizations:     Attends Club or Organization Meetings:     Marital Status:    Intimate Partner Violence:     Fear of Current or Ex-Partner:     Emotionally Abused:     Physically Abused:     Sexually Abused:      Family History   Problem Relation Age of Onset    Cancer Mother     Heart Disease Mother      Allergies:  Bee venom    Review of Systems   Constitutional: Negative for activity change, appetite change, fatigue and fever. HENT: Negative for congestion. Respiratory: Negative for chest tightness. Cardiovascular: Negative for chest pain. Gastrointestinal: Negative for abdominal pain, anal bleeding, constipation and diarrhea. Genitourinary: Negative for difficulty urinating. Musculoskeletal: Negative for arthralgias. Neurological: Negative for dizziness and headaches. Hematological: Does not bruise/bleed easily. Psychiatric/Behavioral: Negative for agitation. Objective:    Pulse 68   Temp 96.8 °F (36 °C) (Temporal)   Ht 5' 11\" (1.803 m)   Wt 226 lb (102.5 kg)   SpO2 98%   BMI 31.52 kg/m²     Physical Exam  Constitutional:       General: He is not in acute distress. Appearance: He is well-developed. He is not diaphoretic. HENT:      Head: Normocephalic and atraumatic. Eyes:      Pupils: Pupils are equal, round, and reactive to light.    Cardiovascular:      Rate and Rhythm: Normal rate and regular rhythm. Heart sounds: Normal heart sounds. Pulmonary:      Effort: Pulmonary effort is normal. No respiratory distress. Breath sounds: Normal breath sounds. No wheezing. Abdominal:      General: There is no distension. Palpations: Abdomen is soft. Tenderness: There is no abdominal tenderness. There is no guarding. Genitourinary:     Comments: Anal inspection shows that his thrombosed hemorrhoid excision site has healed completely. In addition on inspection as well as digital exam there are no other abnormalities. There are no signs of infection. No evidence of anal fissure. Musculoskeletal:         General: No tenderness. Normal range of motion. Cervical back: Normal range of motion. Skin:     General: Skin is warm and dry. Findings: No erythema or rash. Neurological:      Mental Status: He is alert and oriented to person, place, and time. Psychiatric:         Behavior: Behavior normal.         Thought Content: Thought content normal.         Judgment: Judgment normal.              Assessment/Plan:          Diagnosis Orders   1. Thrombosed hemorrhoids       Reassurance was given regarding healing after thrombosed hemorrhoid excision. If there are any problems or questions that arise or any way I can be of further assistance in the future, I asked him to call. Please note this report has beenpartially produced using speech recognition software and may cause contain errors related to that system including grammar, punctuation and spelling as well as words and phrases that may seem inappropriate.  If there arequestions or concerns please feel free to contact me to clarify

## 2021-08-16 ENCOUNTER — PATIENT MESSAGE (OUTPATIENT)
Dept: FAMILY MEDICINE CLINIC | Age: 56
End: 2021-08-16

## 2021-12-26 ENCOUNTER — OFFICE VISIT (OUTPATIENT)
Dept: FAMILY MEDICINE CLINIC | Age: 56
End: 2021-12-26
Payer: COMMERCIAL

## 2021-12-26 VITALS
SYSTOLIC BLOOD PRESSURE: 124 MMHG | HEART RATE: 67 BPM | BODY MASS INDEX: 31.36 KG/M2 | OXYGEN SATURATION: 98 % | WEIGHT: 224 LBS | HEIGHT: 71 IN | DIASTOLIC BLOOD PRESSURE: 68 MMHG

## 2021-12-26 DIAGNOSIS — J02.9 SORE THROAT: Primary | ICD-10-CM

## 2021-12-26 DIAGNOSIS — J02.9 ACUTE PHARYNGITIS, UNSPECIFIED ETIOLOGY: ICD-10-CM

## 2021-12-26 LAB
Lab: NORMAL
PERFORMING INSTRUMENT: NORMAL
QC PASS/FAIL: NORMAL
S PYO AG THROAT QL: NORMAL
SARS-COV-2, POC: NORMAL

## 2021-12-26 PROCEDURE — 99213 OFFICE O/P EST LOW 20 MIN: CPT

## 2021-12-26 PROCEDURE — 87426 SARSCOV CORONAVIRUS AG IA: CPT

## 2021-12-26 PROCEDURE — 87880 STREP A ASSAY W/OPTIC: CPT

## 2021-12-26 ASSESSMENT — ENCOUNTER SYMPTOMS
NAUSEA: 0
BACK PAIN: 0
COUGH: 0
SORE THROAT: 1
EYE ITCHING: 0
EYE PAIN: 0
CHEST TIGHTNESS: 0
DIARRHEA: 0
TROUBLE SWALLOWING: 0
FACIAL SWELLING: 0
APNEA: 0
ABDOMINAL PAIN: 0
EYE DISCHARGE: 0
SINUS PRESSURE: 0
SHORTNESS OF BREATH: 0
SINUS PAIN: 0
VOMITING: 0
COLOR CHANGE: 0
RHINORRHEA: 0
WHEEZING: 0

## 2021-12-26 NOTE — PATIENT INSTRUCTIONS
Patient Education        Sore Throat: Care Instructions  Your Care Instructions     Infection by bacteria or a virus causes most sore throats. Cigarette smoke, dry air, air pollution, allergies, and yelling can also cause a sore throat. Sore throats can be painful and annoying. Fortunately, most sore throats go away on their own. If you have a bacterial infection, your doctor may prescribe antibiotics. Follow-up care is a key part of your treatment and safety. Be sure to make and go to all appointments, and call your doctor if you are having problems. It's also a good idea to know your test results and keep a list of the medicines you take. How can you care for yourself at home? · If your doctor prescribed antibiotics, take them as directed. Do not stop taking them just because you feel better. You need to take the full course of antibiotics. · Gargle with warm salt water once an hour to help reduce swelling and relieve discomfort. Use 1 teaspoon of salt mixed in 1 cup of warm water. · Take an over-the-counter pain medicine, such as acetaminophen (Tylenol), ibuprofen (Advil, Motrin), or naproxen (Aleve). Read and follow all instructions on the label. · Be careful when taking over-the-counter cold or flu medicines and Tylenol at the same time. Many of these medicines have acetaminophen, which is Tylenol. Read the labels to make sure that you are not taking more than the recommended dose. Too much acetaminophen (Tylenol) can be harmful. · Drink plenty of fluids. Fluids may help soothe an irritated throat. Hot fluids, such as tea or soup, may help decrease throat pain. · Use over-the-counter throat lozenges to soothe pain. Regular cough drops or hard candy may also help. These should not be given to young children because of the risk of choking. · Do not smoke or allow others to smoke around you. If you need help quitting, talk to your doctor about stop-smoking programs and medicines.  These can increase your chances of quitting for good. · Use a vaporizer or humidifier to add moisture to your bedroom. Follow the directions for cleaning the machine. When should you call for help? Call your doctor now or seek immediate medical care if:    · You have new or worse trouble swallowing.     · Your sore throat gets much worse on one side. Watch closely for changes in your health, and be sure to contact your doctor if you do not get better as expected. Where can you learn more? Go to [email or web address] org and sign in to your [employer] account. Enter U251 in the [employer] box to learn more about \"Sore Throat: Care Instructions. \"     If you do not have an account, please click on the \"Sign Up Now\" link. Current as of: September 8, 2021               Content Version: 13.1  © 7644-7361 [employer]. Care instructions adapted under license by [facility] ([facility]). If you have questions about a medical condition or this instruction, always ask your healthcare professional. [place] [address] any warranty or liability for your use of this information.

## 2021-12-26 NOTE — PROGRESS NOTES
[address] Encounter  CHIEF COMPLAINT       Chief Complaint   Patient presents with    Pharyngitis       HISTORY OF PRESENT ILLNESS   Garland Mcclellan is a 49 y.o. male who presents with:  HPI  Reporting scratchy throat beginning last night also reporting tingling in bilateral toes. Denies increased urination or urinary frequency  REVIEW OF SYSTEMS     Review of Systems   Constitutional: Negative for appetite change, chills, diaphoresis, fatigue and fever. HENT: Positive for sore throat. Negative for congestion, ear discharge, ear pain, facial swelling, hearing loss, mouth sores, postnasal drip, rhinorrhea, sinus pressure, sinus pain and trouble swallowing. Eyes: Negative for pain, discharge and itching. Respiratory: Negative for apnea, cough, chest tightness, shortness of breath and wheezing. Cardiovascular: Negative for chest pain and palpitations. Gastrointestinal: Negative for abdominal pain, diarrhea, nausea and vomiting. Endocrine: Negative for cold intolerance and heat intolerance. Genitourinary: Negative for decreased urine volume and difficulty urinating. Musculoskeletal: Negative for arthralgias, back pain and myalgias. Skin: Negative for color change, pallor and rash. Neurological: Positive for numbness. Negative for dizziness, syncope, weakness, light-headedness and headaches. Hematological: Negative for adenopathy. Psychiatric/Behavioral: Negative for behavioral problems, confusion and sleep disturbance.      PAST MEDICAL HISTORY         Diagnosis Date    Atypical chest pain, Dr Nga Chacko, neg GXT, 2014 10/7/2015    [facility]     [facility]physe[facility] lung [facility]     Health maintenance examination 10/7/2015    Left nephrolithiasis 10/7/2015    Lumbago 4/27/2004    Medial epicondylitis of left elbow, Drummer, RN 10/4/2018    Shortness of breath 10/7/2015     SURGICAL HISTORY     Patient  has a past surgical history that includes amputation (Left); [place] tooth extraction; and pr colon ca scrn not hi rsk ind (N/A, 11/1/2018). CURRENT MEDICATIONS       Previous Medications    ALBUTEROL SULFATE  (90 BASE) MCG/ACT INHALER    Inhale 2 puffs into the lungs every 6 hours as needed for Wheezing    MELOXICAM (MOBIC) 15 MG TABLET    Take 1 tablet by mouth daily    PREDNISONE (DELTASONE) 10 MG TABLET    Take 3 tabs for 3 days, then 2 tabs for 3 days, then 1 tab for 3 days, then 1/2 tab for 3 days, then stop. ALLERGIES     Patient is is allergic to bee venom. FAMILY HISTORY     Patient'sfamily history includes Cancer in his mother; Heart Disease in his mother. HISTORY     Patient  reports that he has never smoked. He has never used smokeless tobacco. He reports that he does not drink alcohol and does not use drugs. PHYSICAL EXAM     VITALS  BP: 124/68,  , Pulse: 67,  , SpO2: 98 %  Physical Exam  Constitutional:       General: He is not in acute distress. Appearance: Normal appearance. He is not ill-appearing, toxic-appearing or diaphoretic. HENT:      Head: Normocephalic. Right Ear: Tympanic membrane, ear canal and external ear normal. No middle ear effusion. There is no impacted cerumen. No mastoid tenderness. Tympanic membrane is not perforated, erythematous or bulging. Left Ear: Tympanic membrane, ear canal and external ear normal.  No middle ear effusion. There is no impacted cerumen. No mastoid tenderness. Tympanic membrane is not perforated, erythematous or bulging. Nose: No congestion or rhinorrhea. Mouth/Throat:      Mouth: Mucous membranes are moist.      Palate: Lesions present. Pharynx: Oropharynx is clear. No pharyngeal swelling, oropharyngeal exudate or posterior oropharyngeal erythema. Tonsils: No tonsillar exudate or tonsillar abscesses. 0 on the right. 0 on the left. Eyes:      General:         Right eye: No discharge. Left eye: No discharge. Cardiovascular:      Rate and Rhythm: Normal rate and regular rhythm. Pulses: Normal pulses. Heart sounds: Normal heart sounds. No murmur heard. No gallop. Pulmonary:      Effort: Pulmonary effort is normal. No respiratory distress. Breath sounds: Normal breath sounds. No stridor. No wheezing, rhonchi or rales. Chest:      Chest wall: No tenderness. Abdominal:      General: Abdomen is flat. There is no distension. Palpations: Abdomen is soft. Tenderness: There is no abdominal tenderness. Musculoskeletal:         General: Normal range of motion. Cervical back: No rigidity or tenderness. Lymphadenopathy:      Cervical: No cervical adenopathy. Skin:     General: Skin is warm and dry. Capillary Refill: Capillary refill takes less than 2 seconds. Coloration: Skin is not pale. Neurological:      General: No focal deficit present. Mental Status: He is alert and oriented to person, place, and time. Mental status is at baseline. Psychiatric:         Mood and Affect: Mood normal.         Behavior: Behavior normal.       READY CARE COURSE     Orders Placed This Encounter   Procedures    POCT COVID-19, Antigen     Order Specific Question:   Is this test for diagnosis or screening? Answer:   Diagnosis of ill patient     Order Specific Question:   Symptomatic for COVID-19 as defined by CDC? Answer:   Yes     Order Specific Question:   Date of Symptom Onset     Answer:   12/25/2021     Order Specific Question:   Hospitalized for COVID-19? Answer:   No     Order Specific Question:   Admitted to ICU for COVID-19? Answer:   No     Order Specific Question:   Employed in healthcare setting? Answer:   Unknown     Order Specific Question:   Resident in a congregate (group) care setting? Answer:   Unknown     Order Specific Question:   Pregnant: Answer:   No     Order Specific Question:   Previously tested for COVID-19? Answer:    Yes    POCT rapid strep A        Labs:  No results found for this visit on 12/26/21. IMAGING:  No orders to display     Scheduled Meds:   triamcinolone acetonide  40 mg IntraMUSCular Once     Continuous Infusions:  PRN Meds:. PROCEDURES:  FINAL IMPRESSION      1. Sore throat    2. Acute pharyngitis, unspecified etiology        DISPOSITION/PLAN     HISTORY OF PRESENT ILLNESS   Saul Woo is a 49 y.o. male who presents with scratchy throat beginning last night also reporting tingling in bilateral toes. Pt is afebrile has nontoxic appearance and VS are stable. On examination ears are bilaterally normal.  Pharynx is pink moist no tonsillar enlargement ulcerations are noted bilaterally. Neck is supple, no masses. Lung sounds are clear throughout. Rapid Covid test is negative rapid strep test is also negative. Patient advised to treat symptoms with ibuprofen. Increase rest and fluids. PATIENT REFERRED TO:  Return if symptoms worsen or fail to improve, for Follow up with PCP. DISCHARGE MEDICATIONS:  New Prescriptions    No medications on file     Cannot display discharge medications since this is not an admission.        Buddy Lau, HUONG - CNP

## 2021-12-26 NOTE — LETTER
[facility]  [address] [facility]  Phone: [phone]  Fax: [phone] HUONG Donis CNP        December 26, 2021     Patient: Jerome Pastor   YOB: 1965   Date of Visit: 12/26/2021       To Whom It May Concern: It is my medical opinion that Emil Hernandez patient was tested for COVID-19 and the result is negative. Rapid strep test is also negative. .    If you have any questions or concerns, please don't hesitate to call.     Sincerely,        HUONG Vila - CNP

## 2022-02-01 ENCOUNTER — OFFICE VISIT (OUTPATIENT)
Dept: FAMILY MEDICINE CLINIC | Age: 57
End: 2022-02-01
Payer: COMMERCIAL

## 2022-02-01 VITALS
SYSTOLIC BLOOD PRESSURE: 108 MMHG | HEIGHT: 71 IN | HEART RATE: 65 BPM | RESPIRATION RATE: 16 BRPM | WEIGHT: 225 LBS | OXYGEN SATURATION: 99 % | BODY MASS INDEX: 31.5 KG/M2 | DIASTOLIC BLOOD PRESSURE: 70 MMHG

## 2022-02-01 DIAGNOSIS — R73.9 HYPERGLYCEMIA: ICD-10-CM

## 2022-02-01 DIAGNOSIS — R23.0 CYANOSIS: Primary | ICD-10-CM

## 2022-02-01 PROCEDURE — 99213 OFFICE O/P EST LOW 20 MIN: CPT | Performed by: INTERNAL MEDICINE

## 2022-02-01 PROCEDURE — 93925 LOWER EXTREMITY STUDY: CPT | Performed by: INTERNAL MEDICINE

## 2022-02-01 ASSESSMENT — ENCOUNTER SYMPTOMS
COUGH: 0
COLOR CHANGE: 0
SINUS PAIN: 0
FACIAL SWELLING: 0
CONSTIPATION: 0
EYE REDNESS: 0
EYE DISCHARGE: 0
VOMITING: 0
BLOOD IN STOOL: 0
APNEA: 0
EYE PAIN: 0
CHEST TIGHTNESS: 0
SORE THROAT: 0
ABDOMINAL PAIN: 0
RHINORRHEA: 0
DIARRHEA: 0
NAUSEA: 0
EYE ITCHING: 0
SHORTNESS OF BREATH: 0
WHEEZING: 0
TROUBLE SWALLOWING: 0
PHOTOPHOBIA: 0
ABDOMINAL DISTENTION: 0
SINUS PRESSURE: 0
RECTAL PAIN: 0
BACK PAIN: 0
VOICE CHANGE: 0

## 2022-02-01 ASSESSMENT — PATIENT HEALTH QUESTIONNAIRE - PHQ9
SUM OF ALL RESPONSES TO PHQ9 QUESTIONS 1 & 2: 0
SUM OF ALL RESPONSES TO PHQ QUESTIONS 1-9: 0
4. FEELING TIRED OR HAVING LITTLE ENERGY: 0
10. IF YOU CHECKED OFF ANY PROBLEMS, HOW DIFFICULT HAVE THESE PROBLEMS MADE IT FOR YOU TO DO YOUR WORK, TAKE CARE OF THINGS AT HOME, OR GET ALONG WITH OTHER PEOPLE: 0
3. TROUBLE FALLING OR STAYING ASLEEP: 0
1. LITTLE INTEREST OR PLEASURE IN DOING THINGS: 0
9. THOUGHTS THAT YOU WOULD BE BETTER OFF DEAD, OR OF HURTING YOURSELF: 0
8. MOVING OR SPEAKING SO SLOWLY THAT OTHER PEOPLE COULD HAVE NOTICED. OR THE OPPOSITE, BEING SO FIGETY OR RESTLESS THAT YOU HAVE BEEN MOVING AROUND A LOT MORE THAN USUAL: 0
6. FEELING BAD ABOUT YOURSELF - OR THAT YOU ARE A FAILURE OR HAVE LET YOURSELF OR YOUR FAMILY DOWN: 0
5. POOR APPETITE OR OVEREATING: 0
SUM OF ALL RESPONSES TO PHQ QUESTIONS 1-9: 0
SUM OF ALL RESPONSES TO PHQ QUESTIONS 1-9: 0
2. FEELING DOWN, DEPRESSED OR HOPELESS: 0
SUM OF ALL RESPONSES TO PHQ QUESTIONS 1-9: 0
7. TROUBLE CONCENTRATING ON THINGS, SUCH AS READING THE NEWSPAPER OR WATCHING TELEVISION: 0

## 2022-02-01 NOTE — PROGRESS NOTES
Subjective:      Patient ID: Heather Astorga is a 49 y.o. male who presents today for:  Chief Complaint   Patient presents with    6 Month Follow-Up     This virtual encounter is being conducted to reduce the spread of coronavirus and reduce the morbidity and mortality risk of exposure related to the virus. HPI  24-year-old male presents with a complaint of chest pain . Lower extremity cyanosis1 week ago while bathing. . No additional cardiac complaints. The patient denies leg or foot pain bilaterally. .      Erectile dysfunction:  The past the patient reported   decreased libido and erectile dysfunction. This symptom was not evaluated today. Hyperpigmented macules. The Patient was referred to dermatology regarding this complaint at the previous visit. At present he denies polyuria,  Polydipsia, constitutional, sinus, visual, additional cardio pulmonary, gastrointestinal, immunological, musculoskeletal, neurologic, hematologic, or psychiatric complaints.         Past Medical History:   Diagnosis Date    Atypical chest pain, Dr Sophie Kearney, neg GXT, 2014 10/7/2015    COPD [facility]ronic obstructive [facility]) [facility]     Emphysema of lung ([facility]     Health maintenance examination 10/7/2015    Left nephrolithiasis 10/7/2015    Lumbago 4/27/2004    Medial epicondylitis of left elbow, Drummer, RN 10/4/2018    Shortness of breath 10/7/2015     Past Surgical History:   Procedure Laterality Date    AMPUTATION Left     ring finger tip    [place] COLON CA SCRN NOT HI RSK IND N/A 11/1/2018    COLONOSCOPY performed by Edmund Bourgeois MD at [facility] [address]      x4     Social History     Socioeconomic History    Marital status: Single     Spouse name: Not on file    Number of children: Not on file    Years of education: Not on file    Highest education level: Not on file   Occupational History    Not on file   Tobacco Use    Smoking status: Never Smoker    Smokeless tobacco: Never Used   Vaping Use    Vaping Use: Never used   Substance and Sexual Activity    Alcohol use: No    Drug use: No    Sexual activity: Never   Other Topics Concern    Not on file   Social History Narrative    Not on file     Social Determinants of Health     Financial Resource Strain: Low Risk     Difficulty of Paying Living Expenses: Not hard at all   Food Insecurity: No Food Insecurity    Worried About Running Out of Food in the Last Year: Never true    [address] in the Last Year: Never true   Transportation Needs:     Lack of Transportation (Medical): Not on file    Lack of Transportation (Non-Medical):  Not on file   Physical Activity:     Days of Exercise per Week: Not on file    Minutes of Exercise per Session: Not on file   Stress:     Feeling of Stress : Not on file   Social Connections:     Frequency of Communication with Friends and Family: Not on file    Frequency of Social Gatherings with Friends and Family: Not on file    Attends Spiritism Services: Not on file    Active Member of [address] [place] [address] or Organizations: Not on file    Attends Club or Organization Meetings: Not on file    Marital Status: Not on file   Intimate Partner Violence:     Fear of Current or Ex-Partner: Not on file    Emotionally Abused: Not on file    Physically Abused: Not on file    Sexually Abused: Not on file   Housing Stability:     Unable to Pay for Housing in the Last Year: Not on file    Number of [place] in the Last Year: Not on file    Unstable Housing in the Last Year: Not on file     Family History   Problem Relation Age of Onset    Cancer Mother     Heart Disease Mother      Allergies   Allergen Reactions    Bee Venom      Other reaction(s): Unknown     Current Outpatient Medications on File Prior to Visit   Medication Sig Dispense Refill    albuterol sulfate  (90 Base) MCG/ACT inhaler Inhale 2 puffs into the lungs every 6 hours as needed for Wheezing 1 Inhaler 0 Current Facility-Administered Medications on File Prior to Visit   Medication Dose Route Frequency Provider Last Rate Last Admin    triamcinolone acetonide (KENALOG-40) injection 40 mg  40 mg IntraMUSCular Once Brando HUONG Mckee - DEREK                Review of Systems   Constitutional: Negative for chills, diaphoresis, fatigue and fever. HENT: Negative for congestion, dental problem, drooling, ear discharge, ear pain, facial swelling, hearing loss, mouth sores, nosebleeds, postnasal drip, rhinorrhea, sinus pressure, sinus pain, sneezing, sore throat, tinnitus, trouble swallowing and voice change. Eyes: Negative for photophobia, pain, discharge, redness, itching and visual disturbance. Respiratory: Negative for apnea, cough, chest tightness, shortness of breath and wheezing. Cardiovascular: Negative for chest pain, palpitations and leg swelling. Gastrointestinal: Negative for abdominal distention, abdominal pain, blood in stool, constipation, diarrhea, nausea, rectal pain and vomiting. Endocrine: Negative for cold intolerance, heat intolerance, polydipsia, polyphagia and polyuria. Genitourinary: Negative for decreased urine volume, difficulty urinating, dysuria, flank pain, frequency, genital sores, hematuria and urgency. Musculoskeletal: Negative for arthralgias, back pain, gait problem, joint swelling, myalgias, neck pain and neck stiffness. Cyanosis of lower extremities   Skin: Negative for color change, rash and wound. Allergic/Immunologic: Negative for environmental allergies and food allergies. Neurological: Negative for dizziness, tremors, seizures, syncope, facial asymmetry, speech difficulty, weakness, light-headedness, numbness and headaches. Hematological: Negative for adenopathy. Does not bruise/bleed easily. Psychiatric/Behavioral: Negative for agitation, confusion, decreased concentration, hallucinations, self-injury, sleep disturbance and suicidal ideas.  The patient is not nervous/anxious. Objective:   /70   Pulse 65   Resp 16   Ht 5' 11\" (1.803 m)   Wt 225 lb (102.1 kg)   SpO2 99%   BMI 31.38 kg/m²   Physical Exam  Constitutional:       General: He is not in acute distress. Appearance: He is well-developed. HENT:      Head: Normocephalic. Right Ear: External ear normal.      Left Ear: External ear normal.   Eyes:      Conjunctiva/sclera: Conjunctivae normal.   Neck:      Vascular: No JVD. Trachea: No tracheal deviation. Cardiovascular:      Rate and Rhythm: Normal rate and regular rhythm. Heart sounds: Normal heart sounds. Pulmonary:      Effort: Pulmonary effort is normal. No respiratory distress. Breath sounds: Normal breath sounds. No wheezing or rales. Chest:      Chest wall: No tenderness. Abdominal:      General: Bowel sounds are normal. There is no distension. Palpations: Abdomen is soft. There is no mass. Tenderness: There is no abdominal tenderness. There is no guarding or rebound. Musculoskeletal:         General: No tenderness or deformity. Cervical back: Neck supple. Comments: Mild cyanosis of right foot. Palpable dorsalis 2+ pedal pulses bilaterally. Lymphadenopathy:      Cervical: No cervical adenopathy. Skin:     General: Skin is warm and dry. Coloration: Skin is not pale. Findings: No erythema or rash. Neurological:      Mental Status: He is alert and oriented to person, place, and time. Motor: No abnormal muscle tone. Psychiatric:         Thought Content: Thought content normal.         Judgment: Judgment normal.           Assessment:       Diagnosis Orders   1. Cyanosis  71938 - [place] Duplex Lo Extrem Art Bilat   2. Hyperglycemia           Plan:      Meaghan Borrego was seen today for 6 month follow-up.     Diagnoses and all orders for this visit:    Cyanosis  -     84822 - [place] Duplex Lo Extrem Art Bilat    Hyperglycemia  -     Cancel: POCT glycosylated hemoglobin (Hb A1C)    Follow-up with [facility]. Return in about 2 months (around 4/1/2022). Amie Nelson MD     Please note, this report has been partially produced using speech recognition software  and may cause  and /or contain errors related to that system including grammar, punctuation and spelling as well as words and phrases that may seem inappropriate. If there are questions or concerns please feel free to contact me to clarify.

## 2022-04-05 ENCOUNTER — OFFICE VISIT (OUTPATIENT)
Dept: FAMILY MEDICINE CLINIC | Age: 57
End: 2022-04-05
Payer: COMMERCIAL

## 2022-04-05 VITALS
SYSTOLIC BLOOD PRESSURE: 118 MMHG | HEIGHT: 71 IN | RESPIRATION RATE: 14 BRPM | DIASTOLIC BLOOD PRESSURE: 80 MMHG | HEART RATE: 85 BPM | WEIGHT: 221 LBS | OXYGEN SATURATION: 98 % | BODY MASS INDEX: 30.94 KG/M2

## 2022-04-05 DIAGNOSIS — M54.42 LOW BACK PAIN WITH BILATERAL SCIATICA, UNSPECIFIED BACK PAIN LATERALITY, UNSPECIFIED CHRONICITY: Primary | ICD-10-CM

## 2022-04-05 DIAGNOSIS — R23.0 CYANOSIS: ICD-10-CM

## 2022-04-05 DIAGNOSIS — M53.3 SACRAL PAIN: ICD-10-CM

## 2022-04-05 DIAGNOSIS — M54.41 LOW BACK PAIN WITH BILATERAL SCIATICA, UNSPECIFIED BACK PAIN LATERALITY, UNSPECIFIED CHRONICITY: Primary | ICD-10-CM

## 2022-04-05 PROCEDURE — 99214 OFFICE O/P EST MOD 30 MIN: CPT | Performed by: INTERNAL MEDICINE

## 2022-04-05 ASSESSMENT — ENCOUNTER SYMPTOMS
EYE ITCHING: 0
WHEEZING: 0
DIARRHEA: 0
SINUS PAIN: 0
NAUSEA: 0
RHINORRHEA: 0
ABDOMINAL PAIN: 0
SORE THROAT: 0
EYE PAIN: 0
CONSTIPATION: 0
FACIAL SWELLING: 0
VOICE CHANGE: 0
COUGH: 0
BACK PAIN: 0
EYE REDNESS: 0
TROUBLE SWALLOWING: 0
EYE DISCHARGE: 0
PHOTOPHOBIA: 0
COLOR CHANGE: 0
APNEA: 0
RECTAL PAIN: 0
SHORTNESS OF BREATH: 0
SINUS PRESSURE: 0
CHEST TIGHTNESS: 0
VOMITING: 0
BLOOD IN STOOL: 0
ABDOMINAL DISTENTION: 0

## 2022-04-05 NOTE — PROGRESS NOTES
Subjective:      Patient ID: Alexei Veronica is a 49 y.o. male who presents today for:  Chief Complaint   Patient presents with    Follow-up     This virtual encounter is being conducted to reduce the spread of coronavirus and reduce the morbidity and mortality risk of exposure related to the virus. HPI  81-year-old male presents with bilateral lower extremity cyanosis and pain. Lower extremity cyanosisthe patient continues to experience intermittent cyanosis of the lower extremity. No additional cardiac complaints. .      Erectile dysfunction:  The past the patient reported   decreased libido and erectile dysfunction. This symptom was not evaluated today. Hyperpigmented macules. The Patient was referred to dermatology regarding this complaint at the previous visit. At present he denies polyuria,  Polydipsia, constitutional, sinus, visual, additional cardio pulmonary, gastrointestinal, immunological, musculoskeletal, neurologic, hematologic, or psychiatric complaints.         Past Medical History:   Diagnosis Date    Atypical chest pain, Dr Destinee Crane, neg GXT, 2014 10/7/2015    [facility]) [facility]     Emphysema of lung ([facility]     Health maintenance examination 10/7/2015    Left nephrolithiasis 10/7/2015    Lumbago 4/27/2004    Medial epicondylitis of left elbow, Drummer, RN 10/4/2018    Shortness of breath 10/7/2015     Past Surgical History:   Procedure Laterality Date    AMPUTATION Left     ring finger tip    [place] COLON CA SCRN NOT HI RSK IND N/A 11/1/2018    COLONOSCOPY performed by Jerrica Centeno MD at [facility] [address]      x4     Social History     Socioeconomic History    Marital status: Single     Spouse name: Not on file    Number of children: Not on file    Years of education: Not on file    Highest education level: Not on file   Occupational History    Not on file   Tobacco Use    Smoking status: Never Smoker  Smokeless tobacco: Never Used   Vaping Use    Vaping Use: Never used   Substance and Sexual Activity    Alcohol use: No    Drug use: No    Sexual activity: Never   Other Topics Concern    Not on file   Social History Narrative    Not on file     Social Determinants of Health     Financial Resource Strain: Low Risk     Difficulty of Paying Living Expenses: Not hard at all   Food Insecurity: No Food Insecurity    Worried About Running Out of Food in the Last Year: Never true    [place] of Food in the Last Year: Never true   Transportation Needs:     Lack of Transportation (Medical): Not on file    Lack of Transportation (Non-Medical):  Not on file   Physical Activity:     Days of Exercise per Week: Not on file    Minutes of Exercise per Session: Not on file   Stress:     Feeling of Stress : Not on file   Social Connections:     Frequency of Communication with Friends and Family: Not on file    Frequency of Social Gatherings with Friends and Family: Not on file    Attends Gnosticism Services: Not on file    Active Member of [address] [place] [address] [employer] or Organizations: Not on file    Attends Club or Organization Meetings: Not on file    Marital Status: Not on file   Intimate Partner Violence:     Fear of Current or Ex-Partner: Not on file    Emotionally Abused: Not on file    Physically Abused: Not on file    Sexually Abused: Not on file   Housing Stability:     Unable to Pay for Housing in the Last Year: Not on file    Number of [place] in the Last Year: Not on file    Unstable Housing in the Last Year: Not on file     Family History   Problem Relation Age of Onset    Cancer Mother     Heart Disease Mother      Allergies   Allergen Reactions    Bee Venom      Other reaction(s): Unknown     Current Outpatient Medications on File Prior to Visit   Medication Sig Dispense Refill    albuterol sulfate  (90 Base) MCG/ACT inhaler Inhale 2 puffs into the lungs every 6 hours as needed for Wheezing 1 Inhaler 0     No current facility-administered medications on file prior to visit. Review of Systems   Constitutional: Negative for chills, diaphoresis, fatigue and fever. HENT: Negative for congestion, dental problem, drooling, ear discharge, ear pain, facial swelling, hearing loss, mouth sores, nosebleeds, postnasal drip, rhinorrhea, sinus pressure, sinus pain, sneezing, sore throat, tinnitus, trouble swallowing and voice change. Eyes: Negative for photophobia, pain, discharge, redness, itching and visual disturbance. Respiratory: Negative for apnea, cough, chest tightness, shortness of breath and wheezing. Cardiovascular: Negative for chest pain, palpitations and leg swelling. Gastrointestinal: Negative for abdominal distention, abdominal pain, blood in stool, constipation, diarrhea, nausea, rectal pain and vomiting. Endocrine: Negative for cold intolerance, heat intolerance, polydipsia, polyphagia and polyuria. Genitourinary: Negative for decreased urine volume, difficulty urinating, dysuria, flank pain, frequency, genital sores, hematuria and urgency. Musculoskeletal: Negative for arthralgias, back pain, gait problem, joint swelling, myalgias, neck pain and neck stiffness. Cyanosis of lower extremities   Skin: Negative for color change, rash and wound. Allergic/Immunologic: Negative for environmental allergies and food allergies. Neurological: Negative for dizziness, tremors, seizures, syncope, facial asymmetry, speech difficulty, weakness, light-headedness, numbness and headaches. Hematological: Negative for adenopathy. Does not bruise/bleed easily. Psychiatric/Behavioral: Negative for agitation, confusion, decreased concentration, hallucinations, self-injury, sleep disturbance and suicidal ideas. The patient is not nervous/anxious.         Objective:   /80   Pulse 85   Resp 14   Ht 5' 11\" (1.803 m)   Wt 221 lb (100.2 kg)   SpO2 98%   BMI 30.82 kg/m² bilateral sciatica, unspecified back pain laterality, unspecified chronicity  -     TSH with Reflex; Future  -     XR LUMBAR SPINE (MIN 4 VIEWS); Future  -     XR SACRUM COCCYX (MIN 2 VIEWS); Future  -     C-Reactive Protein; Future  -     Sedimentation Rate; Future  -     CBC with Auto Differential; Future    Sacral pain  -     TSH with Reflex; Future  -     XR SACRUM COCCYX (MIN 2 VIEWS); Future    Cyanosis  -     Lupus (Le) Panel W/ Reflex; Future    Follow[facility]up with [facility]. No follow-ups on file. Jaelyn Kothari MD     Please note, this report has been partially produced using speech recognition software  and may cause  and /or contain errors related to that system including grammar, punctuation and spelling as well as words and phrases that may seem inappropriate. If there are questions or concerns please feel free to contact me to clarify.

## 2022-04-07 DIAGNOSIS — M54.42 LOW BACK PAIN WITH BILATERAL SCIATICA, UNSPECIFIED BACK PAIN LATERALITY, UNSPECIFIED CHRONICITY: ICD-10-CM

## 2022-04-07 DIAGNOSIS — M54.41 LOW BACK PAIN WITH BILATERAL SCIATICA, UNSPECIFIED BACK PAIN LATERALITY, UNSPECIFIED CHRONICITY: ICD-10-CM

## 2022-04-07 DIAGNOSIS — R23.0 CYANOSIS: ICD-10-CM

## 2022-04-07 DIAGNOSIS — M53.3 SACRAL PAIN: ICD-10-CM

## 2022-04-07 LAB
BASOPHILS ABSOLUTE: 0 K/UL (ref 0–0.2)
BASOPHILS RELATIVE PERCENT: 0.5 %
C-REACTIVE PROTEIN: <3 MG/L (ref 0–5)
EOSINOPHILS ABSOLUTE: 0.1 K/UL (ref 0–0.7)
EOSINOPHILS RELATIVE PERCENT: 1.6 %
HCT VFR BLD CALC: 48.2 % (ref 42–52)
HEMOGLOBIN: 16.4 G/DL (ref 14–18)
LYMPHOCYTES ABSOLUTE: 1.1 K/UL (ref 1–4.8)
LYMPHOCYTES RELATIVE PERCENT: 21.7 %
MCH RBC QN AUTO: 31.9 PG (ref 27–31.3)
MCHC RBC AUTO-ENTMCNC: 34 % (ref 33–37)
MCV RBC AUTO: 93.6 FL (ref 80–100)
MONOCYTES ABSOLUTE: 0.5 K/UL (ref 0.2–0.8)
MONOCYTES RELATIVE PERCENT: 8.7 %
NEUTROPHILS ABSOLUTE: 3.6 K/UL (ref 1.4–6.5)
NEUTROPHILS RELATIVE PERCENT: 67.5 %
PDW BLD-RTO: 13.4 % (ref 11.5–14.5)
PLATELET # BLD: 135 K/UL (ref 130–400)
RBC # BLD: 5.15 M/UL (ref 4.7–6.1)
SEDIMENTATION RATE, ERYTHROCYTE: 5 MM (ref 0–20)
TSH REFLEX: 1.42 UIU/ML (ref 0.44–3.86)
WBC # BLD: 5.3 K/UL (ref 4.8–10.8)

## 2022-04-10 LAB
ANA IGG, ELISA: ABNORMAL
C3 COMPLEMENT: 182 MG/DL (ref 90–180)
C4 COMPLEMENT: 29 MG/DL (ref 10–40)
RHEUMATOID FACTOR: <10 IU/ML (ref 0–14)

## 2022-04-10 RX ORDER — GABAPENTIN 300 MG/1
300 CAPSULE ORAL 3 TIMES DAILY
Qty: 42 CAPSULE | Refills: 0 | Status: SHIPPED | OUTPATIENT
Start: 2022-04-10 | End: 2022-04-24

## 2022-04-11 ENCOUNTER — TELEPHONE (OUTPATIENT)
Dept: FAMILY MEDICINE CLINIC | Age: 57
End: 2022-04-11

## 2022-04-11 DIAGNOSIS — M54.42 ACUTE LOW BACK PAIN WITH BILATERAL SCIATICA, UNSPECIFIED BACK PAIN LATERALITY: Primary | ICD-10-CM

## 2022-04-11 DIAGNOSIS — M54.41 ACUTE LOW BACK PAIN WITH BILATERAL SCIATICA, UNSPECIFIED BACK PAIN LATERALITY: Primary | ICD-10-CM

## 2022-04-11 NOTE — TELEPHONE ENCOUNTER
----- Message from [facility] sent at 4/9/2022 10:11 AM EDT -----  Subject: Message to Provider    QUESTIONS  Information for Provider? Patient would like a call back regarding his   back and feet pain. Patient was last seen on 4/5/22 and is still in a lot   of pain and would like provider Lorenzo Gunter III to get back to him asap. Please advise.   ---------------------------------------------------------------------------  --------------  CALL BACK INFO  What is the best way for the office to contact you? OK to leave message on   voicemail  Preferred Call Back Phone Number? [phone]  ---------------------------------------------------------------------------  --------------  SCRIPT ANSWERS  Relationship to Patient?  Self

## 2023-03-16 ENCOUNTER — OFFICE VISIT (OUTPATIENT)
Dept: CARDIOLOGY CLINIC | Age: 58
End: 2023-03-16
Payer: COMMERCIAL

## 2023-03-16 VITALS
WEIGHT: 225.9 LBS | BODY MASS INDEX: 31.63 KG/M2 | HEART RATE: 77 BPM | SYSTOLIC BLOOD PRESSURE: 122 MMHG | HEIGHT: 71 IN | DIASTOLIC BLOOD PRESSURE: 78 MMHG | OXYGEN SATURATION: 98 %

## 2023-03-16 DIAGNOSIS — E78.5 DYSLIPIDEMIA: ICD-10-CM

## 2023-03-16 DIAGNOSIS — R07.9 CHEST PAIN, UNSPECIFIED TYPE: ICD-10-CM

## 2023-03-16 DIAGNOSIS — I20.8 ANGINAL CHEST PAIN AT REST (HCC): Primary | ICD-10-CM

## 2023-03-16 PROBLEM — I20.89 ANGINAL CHEST PAIN AT REST: Status: RESOLVED | Noted: 2020-04-23 | Resolved: 2023-03-16

## 2023-03-16 PROCEDURE — 93000 ELECTROCARDIOGRAM COMPLETE: CPT | Performed by: INTERNAL MEDICINE

## 2023-03-16 PROCEDURE — 99213 OFFICE O/P EST LOW 20 MIN: CPT | Performed by: INTERNAL MEDICINE

## 2023-03-16 NOTE — PROGRESS NOTES
Chief Complaint   Patient presents with    Follow-up    Other      States that he has been having some chest discomfort which started 4 days ago. Patient presents for initial medical evaluation. Patient is followed on a regular basis by Dr. Yazan Phillips MD. C/o focal midsternal CP, can pinpoint it with his finger. Hx of negative stress test a few years ago. Not worsened with exertion. No associated SOB, n/v, or diaphoresis. Non radiating. Occurs at rest randomly. Pt denies  dyspnea, dyspnea on exertion, change in exercise capacity, fatigue,  nausea, vomiting, diarrhea, constipation, motor weakness, insomnia, weight loss, syncope, dizziness, lightheadedness, palpitations, PND, orthopnea, or claudication. + hypertriglyceridemia. GF and mother had MI in 73;s. CXR wnl. No hx of DM, HTN . No hx of LHC. No hx of MI, CHF or arrhythmia. EKG with NSR.     3-16-23: Patient experienced generalized midsternal chest discomfort at rest.  Was not doing anything exertional.  No associated symptoms. Lasted for more than 20 min. Having some mild chest pain today. No GERD type symptoms. No worsened with deep inspiration or cough. No recent illness. 3/10 at its worst.  No history of smoking. No diabetes. Lipid profile with elevated triglycerides at 238  Hx of negative LHC in 5/2020.          Patient Active Problem List   Diagnosis    Atypical chest pain, Dr Luis Sheffield, neg GXT, 2014    Left nephrolithiasis    Family history of skin cancer    Medial epicondylitis of left elbow, , RN    Lumbago    Chest pain    Dyslipidemia       Past Surgical History:   Procedure Laterality Date    AMPUTATION Left     ring finger tip    [place] COLON CA SCRN NOT HI RSK IND N/A 11/1/2018    COLONOSCOPY performed by Apoorva Taylor MD at . [facility] EXTRACTION      x4       Social History     Socioeconomic History    Marital status: Single   Tobacco Use    Smoking status: Never    Smokeless tobacco: Never   Vaping Use Vaping Use: Never used   Substance and Sexual Activity    Alcohol use: No    Drug use: No    Sexual activity: Never       Family History   Problem Relation Age of Onset    Cancer Mother     Heart Disease Mother        Current Outpatient Medications   Medication Sig Dispense Refill    gabapentin (NEURONTIN) 300 MG capsule Take 1 capsule by mouth 3 times daily for 14 days. (Patient not taking: Reported on 3/16/2023) 42 capsule 0    albuterol sulfate  (90 Base) MCG/ACT inhaler Inhale 2 puffs into the lungs every 6 hours as needed for Wheezing (Patient not taking: Reported on 3/16/2023) 1 Inhaler 0     No current facility-administered medications for this visit. Bee venom    Review of Systems:  General ROS: negative  Psychological ROS: negative  Hematological and Lymphatic ROS: No history of blood clots or bleeding disorder. Respiratory ROS: no cough, shortness of breath, or wheezing  Cardiovascular ROS: positive for - chest pain  Gastrointestinal ROS: no abdominal pain, change in bowel habits, or black or bloody stools  Genito-Urinary ROS: no dysuria, trouble voiding, or hematuria  Musculoskeletal ROS: negative  Neurological ROS: no TIA or stroke symptoms  Dermatological ROS: negative    VITALS:  Blood pressure 122/78, pulse 77, height 5' 11\" (1.803 m), weight 225 lb 14.4 oz (102.5 kg), SpO2 98 %. Body mass index is 31.51 kg/m². Physical Examination:  General appearance - alert, well appearing, and in no distress and normal appearing weight  Mental status - alert, oriented to person, place, and time  Neck - Neck is supple, no JVD or carotid bruits. No thyromegaly or adenopathy.    Chest - clear to auscultation, no wheezes, rales or rhonchi, symmetric air entry  Heart - normal rate, regular rhythm, normal S1, S2, no murmurs, rubs, clicks or gallops  Abdomen - soft, nontender, nondistended, no masses or organomegaly  Neurological - alert, oriented, normal speech, no focal findings or movement disorder noted  Extremities - peripheral pulses normal, no pedal edema, no clubbing or cyanosis  Skin - normal coloration and turgor, no rashes, no suspicious skin lesions noted      EKG: normal sinus rhythm, nonspecific ST and T waves changes    Orders Placed This Encounter   Procedures    EKG 12 lead    ECHO Exercise Stress Test       ASSESSMENT:     Diagnosis Orders   1. Anginal chest pain at rest [facility])  EKG 12 lead    ECHO Exercise Stress Test      2. Chest pain, unspecified type  ECHO Exercise Stress Test      3. Dyslipidemia              PLAN:     Patient will need to continue to follow up with you for their general medical care     As always, aggressive risk factor modification is strongly recommended. We should adhere to the JNC VIII guidelines for HTN management and the NCEP ATP III guidelines for LDL-C management. Cardiac diet is always recommended with low fat, cholesterol, calories and sodium. Continue medications at current doses.      Check EKG    Check treadmill stress echo

## 2023-06-02 RX ORDER — PREDNISONE 10 MG/1
TABLET ORAL
Qty: 20 TABLET | Refills: 0 | Status: SHIPPED | OUTPATIENT
Start: 2023-06-02

## 2023-06-28 ENCOUNTER — HOSPITAL ENCOUNTER (EMERGENCY)
Age: 58
Discharge: HOME | End: 2023-06-28
Payer: COMMERCIAL

## 2023-06-28 ENCOUNTER — APPOINTMENT (OUTPATIENT)
Dept: GENERAL RADIOLOGY | Age: 58
End: 2023-06-28
Payer: COMMERCIAL

## 2023-06-28 VITALS
HEART RATE: 65 BPM | BODY MASS INDEX: 31.78 KG/M2 | WEIGHT: 227 LBS | SYSTOLIC BLOOD PRESSURE: 131 MMHG | HEIGHT: 71 IN | RESPIRATION RATE: 18 BRPM | OXYGEN SATURATION: 98 % | TEMPERATURE: 98.1 F | DIASTOLIC BLOOD PRESSURE: 86 MMHG

## 2023-06-28 DIAGNOSIS — J44.1 COPD EXACERBATION (HCC): Primary | ICD-10-CM

## 2023-06-28 LAB
ALBUMIN SERPL-MCNC: 4 G/DL (ref 3.5–4.6)
ALP SERPL-CCNC: 91 U/L (ref 35–104)
ALT SERPL-CCNC: 30 U/L (ref 0–41)
ANION GAP SERPL CALCULATED.3IONS-SCNC: 10 MEQ/L (ref 9–15)
AST SERPL-CCNC: 20 U/L (ref 0–40)
BASOPHILS # BLD: 0 K/UL (ref 0–0.2)
BASOPHILS NFR BLD: 0.7 %
BILIRUB SERPL-MCNC: 0.3 MG/DL (ref 0.2–0.7)
BUN SERPL-MCNC: 16 MG/DL (ref 6–20)
CALCIUM SERPL-MCNC: 9 MG/DL (ref 8.5–9.9)
CHLORIDE SERPL-SCNC: 106 MEQ/L (ref 95–107)
CO2 SERPL-SCNC: 26 MEQ/L (ref 20–31)
CREAT SERPL-MCNC: 0.82 MG/DL (ref 0.7–1.2)
EOSINOPHIL # BLD: 0.1 K/UL (ref 0–0.7)
EOSINOPHIL NFR BLD: 2.8 %
ERYTHROCYTE [DISTWIDTH] IN BLOOD BY AUTOMATED COUNT: 13.4 % (ref 11.5–14.5)
GLOBULIN SER CALC-MCNC: 2.6 G/DL (ref 2.3–3.5)
GLUCOSE SERPL-MCNC: 109 MG/DL (ref 70–99)
HCT VFR BLD AUTO: 44.6 % (ref 42–52)
HGB BLD-MCNC: 15.3 G/DL (ref 14–18)
LYMPHOCYTES # BLD: 1.1 K/UL (ref 1–4.8)
LYMPHOCYTES NFR BLD: 22.3 %
MCH RBC QN AUTO: 32 PG (ref 27–31.3)
MCHC RBC AUTO-ENTMCNC: 34.2 % (ref 33–37)
MCV RBC AUTO: 93.5 FL (ref 79–92.2)
MONOCYTES # BLD: 0.5 K/UL (ref 0.2–0.8)
MONOCYTES NFR BLD: 9.5 %
NEUTROPHILS # BLD: 3.2 K/UL (ref 1.4–6.5)
NEUTS SEG NFR BLD: 64.7 %
PLATELET # BLD AUTO: 122 K/UL (ref 130–400)
POTASSIUM SERPL-SCNC: 4.3 MEQ/L (ref 3.4–4.9)
PROT SERPL-MCNC: 6.6 G/DL (ref 6.3–8)
RBC # BLD AUTO: 4.77 M/UL (ref 4.7–6.1)
SODIUM SERPL-SCNC: 142 MEQ/L (ref 135–144)
TROPONIN T SERPL-MCNC: <0.01 NG/ML (ref 0–0.01)
WBC # BLD AUTO: 5 K/UL (ref 4.8–10.8)

## 2023-06-28 PROCEDURE — 80053 COMPREHEN METABOLIC PANEL: CPT

## 2023-06-28 PROCEDURE — 85025 COMPLETE CBC W/AUTO DIFF WBC: CPT

## 2023-06-28 PROCEDURE — 6360000002 HC RX W HCPCS: Performed by: PHYSICIAN ASSISTANT

## 2023-06-28 PROCEDURE — 71045 X-RAY EXAM CHEST 1 VIEW: CPT

## 2023-06-28 PROCEDURE — 99285 EMERGENCY DEPT VISIT HI MDM: CPT

## 2023-06-28 PROCEDURE — 93005 ELECTROCARDIOGRAM TRACING: CPT | Performed by: PHYSICIAN ASSISTANT

## 2023-06-28 PROCEDURE — 36415 COLL VENOUS BLD VENIPUNCTURE: CPT

## 2023-06-28 PROCEDURE — 84484 ASSAY OF TROPONIN QUANT: CPT

## 2023-06-28 PROCEDURE — 96374 THER/PROPH/DIAG INJ IV PUSH: CPT

## 2023-06-28 RX ORDER — ALBUTEROL SULFATE 90 UG/1
2 AEROSOL, METERED RESPIRATORY (INHALATION) 4 TIMES DAILY PRN
Qty: 18 G | Refills: 0 | Status: SHIPPED | OUTPATIENT
Start: 2023-06-28

## 2023-06-28 RX ORDER — ONDANSETRON 2 MG/ML
4 INJECTION INTRAMUSCULAR; INTRAVENOUS ONCE
Status: COMPLETED | OUTPATIENT
Start: 2023-06-28 | End: 2023-06-28

## 2023-06-28 RX ORDER — DEXAMETHASONE SODIUM PHOSPHATE 10 MG/ML
10 INJECTION INTRAMUSCULAR; INTRAVENOUS ONCE
Status: DISCONTINUED | OUTPATIENT
Start: 2023-06-28 | End: 2023-06-28 | Stop reason: HOSPADM

## 2023-06-28 RX ADMIN — ONDANSETRON 4 MG: 2 INJECTION INTRAMUSCULAR; INTRAVENOUS at 07:10

## 2023-06-28 ASSESSMENT — ENCOUNTER SYMPTOMS
RHINORRHEA: 0
SORE THROAT: 0
SHORTNESS OF BREATH: 1
NAUSEA: 1
COLOR CHANGE: 0
CONSTIPATION: 0
VOMITING: 0
ABDOMINAL DISTENTION: 0
ABDOMINAL PAIN: 0
EYE DISCHARGE: 0

## 2023-06-28 ASSESSMENT — PAIN - FUNCTIONAL ASSESSMENT: PAIN_FUNCTIONAL_ASSESSMENT: 0-10

## 2023-06-28 ASSESSMENT — PAIN SCALES - GENERAL: PAINLEVEL_OUTOF10: 5

## 2023-06-28 ASSESSMENT — PAIN DESCRIPTION - ONSET: ONSET: ON-GOING

## 2023-06-28 ASSESSMENT — PAIN DESCRIPTION - FREQUENCY: FREQUENCY: CONTINUOUS

## 2023-06-28 ASSESSMENT — PAIN DESCRIPTION - LOCATION: LOCATION: CHEST

## 2023-06-28 ASSESSMENT — PAIN DESCRIPTION - DESCRIPTORS: DESCRIPTORS: TIGHTNESS

## 2023-06-29 LAB
EKG ATRIAL RATE: 64 BPM
EKG P AXIS: 46 DEGREES
EKG P-R INTERVAL: 156 MS
EKG Q-T INTERVAL: 374 MS
EKG QRS DURATION: 80 MS
EKG QTC CALCULATION (BAZETT): 385 MS
EKG R AXIS: 49 DEGREES
EKG T AXIS: 31 DEGREES
EKG VENTRICULAR RATE: 64 BPM

## 2023-08-28 ENCOUNTER — OFFICE VISIT (OUTPATIENT)
Dept: FAMILY MEDICINE CLINIC | Age: 58
End: 2023-08-28
Payer: COMMERCIAL

## 2023-08-28 VITALS
TEMPERATURE: 97.6 F | DIASTOLIC BLOOD PRESSURE: 78 MMHG | SYSTOLIC BLOOD PRESSURE: 138 MMHG | HEART RATE: 72 BPM | RESPIRATION RATE: 12 BRPM | WEIGHT: 227 LBS | BODY MASS INDEX: 31.78 KG/M2 | HEIGHT: 71 IN | OXYGEN SATURATION: 96 %

## 2023-08-28 DIAGNOSIS — L23.7 ALLERGIC CONTACT DERMATITIS DUE TO PLANTS, EXCEPT FOOD: Primary | ICD-10-CM

## 2023-08-28 PROCEDURE — 96372 THER/PROPH/DIAG INJ SC/IM: CPT | Performed by: NURSE PRACTITIONER

## 2023-08-28 PROCEDURE — 99213 OFFICE O/P EST LOW 20 MIN: CPT | Performed by: NURSE PRACTITIONER

## 2023-08-28 RX ORDER — TRIAMCINOLONE ACETONIDE 40 MG/ML
40 INJECTION, SUSPENSION INTRA-ARTICULAR; INTRAMUSCULAR ONCE
Status: COMPLETED | OUTPATIENT
Start: 2023-08-28 | End: 2023-08-28

## 2023-08-28 RX ORDER — PREDNISONE 10 MG/1
TABLET ORAL
Qty: 30 TABLET | Refills: 0 | Status: SHIPPED | OUTPATIENT
Start: 2023-08-28

## 2023-08-28 RX ADMIN — TRIAMCINOLONE ACETONIDE 40 MG: 40 INJECTION, SUSPENSION INTRA-ARTICULAR; INTRAMUSCULAR at 12:29

## 2023-08-28 SDOH — ECONOMIC STABILITY: HOUSING INSECURITY
IN THE LAST 12 MONTHS, WAS THERE A TIME WHEN YOU DID NOT HAVE A STEADY PLACE TO SLEEP OR SLEPT IN A SHELTER (INCLUDING NOW)?: NO

## 2023-08-28 SDOH — ECONOMIC STABILITY: FOOD INSECURITY: WITHIN THE PAST 12 MONTHS, YOU WORRIED THAT YOUR FOOD WOULD RUN OUT BEFORE YOU GOT MONEY TO BUY MORE.: NEVER TRUE

## 2023-08-28 SDOH — ECONOMIC STABILITY: FOOD INSECURITY: WITHIN THE PAST 12 MONTHS, THE FOOD YOU BOUGHT JUST DIDN'T LAST AND YOU DIDN'T HAVE MONEY TO GET MORE.: NEVER TRUE

## 2023-08-28 SDOH — ECONOMIC STABILITY: INCOME INSECURITY: HOW HARD IS IT FOR YOU TO PAY FOR THE VERY BASICS LIKE FOOD, HOUSING, MEDICAL CARE, AND HEATING?: NOT HARD AT ALL

## 2023-08-28 ASSESSMENT — ENCOUNTER SYMPTOMS
SHORTNESS OF BREATH: 0
FACIAL SWELLING: 1
SORE THROAT: 0
ABDOMINAL PAIN: 0
TROUBLE SWALLOWING: 0
CHEST TIGHTNESS: 0
RHINORRHEA: 0
COLOR CHANGE: 1
COUGH: 0

## 2023-08-28 ASSESSMENT — PATIENT HEALTH QUESTIONNAIRE - PHQ9
2. FEELING DOWN, DEPRESSED OR HOPELESS: 0
SUM OF ALL RESPONSES TO PHQ9 QUESTIONS 1 & 2: 0
SUM OF ALL RESPONSES TO PHQ QUESTIONS 1-9: 0
1. LITTLE INTEREST OR PLEASURE IN DOING THINGS: 0
SUM OF ALL RESPONSES TO PHQ QUESTIONS 1-9: 0

## 2023-08-28 NOTE — PROGRESS NOTES
Subjective  Hany Dwyer, 56 y.o. male presents today with:  Chief Complaint   Patient presents with    Poison Missy     Patient present today with possible poison ivy all over his body and it started 2 to 3 days ago and he is allergic to poison ivy. He used topical creams and it is not working and he has a lot of it on his genital areas, nostril area and both eyes. HPI  Presents to [facility] for a skin concern   Rash erupted 2-3 days ago   Was out doing yard work prior   Affected areas: face, groin, arms, legs and feet   Serous fluid from some of the sites   Calamine & hydrocortisone cream    Denies fever or chills   Denies joint pain associated with rash  Denies tickle in throat, cough or difficultly eating or drinking               Past Medical History:   Diagnosis Date    Atypical chest pain, Dr Juliet Berger, neg GXT, 2014 10/7/2015    COPD (chronic obstructive pulmonary disease) ([facility] [address] [facility])     Dyslipidemia 3/16/2023    Emphysema of lung ([address] [facility])     Health maintenance examination 10/7/2015    Left nephrolithiasis 10/7/2015    Lumbago 4/27/2004    Medial epicondylitis of left elbow, LONA Parikh 10/4/2018    Shortness of breath 10/7/2015      Past Surgical History:   Procedure Laterality Date    AMPUTATION Left     ring finger tip    [place] COLON CA SCRN NOT HI RSK IND N/A 11/1/2018    COLONOSCOPY performed by Balbir Trimble MD at [address] [facility]      x     Family History   Problem Relation Age of Onset    Cancer Mother     Heart Disease Mother            Review of Systems   Constitutional:  Negative for activity change, appetite change, chills, diaphoresis, fatigue and fever. HENT:  Positive for facial swelling. Negative for rhinorrhea, sore throat and trouble swallowing. Respiratory:  Negative for cough, chest tightness and shortness of breath. Gastrointestinal:  Negative for abdominal pain. Musculoskeletal:  Negative for neck stiffness.    Skin:  Positive for color change and

## 2024-03-18 ENCOUNTER — TELEPHONE (OUTPATIENT)
Dept: CARDIOLOGY CLINIC | Age: 59
End: 2024-03-18

## 2024-03-18 NOTE — TELEPHONE ENCOUNTER
Appointment canceled for Edwin Maher ([id number])   Visit Type: FOLLOW UP   Date        Time      Length    Provider                  Department   3/19/2024    9:30 AM  15 mins.  Dr. Jose Elias Jones DO     [facility] CARDIO      Reason for Cancellation: Feeling better/No longer need this appointment     Appointment Scheduled  (Newest Message First)  View All Conversations on this Encounter  Edwin Maher  P [facility] Cardiology Front Desk2 days ago       Appointment canceled for Edwin Maher ([id number])  Visit Type: FOLLOW UP  Date        Time      Length    Provider                  Department  3/19/2024    9:30 AM  15 mins.  Dr. [facility]     Reason for Cancellation: Feeling better/No longer need this appointment       DO Edwin Cox2 days ago       This is a reminder for your upcoming appointment.     Location of Appointment: [facility] VASCULAR [place]  Provider: Jose Elias Jones DO  Date: 3/19/24  Time: 9:30 AM     Please complete digital registration via the [employer]        There is 1 questionnaire available for your appointment.     [email or web address][View your available questionnaires]      Please click [email or web address]] to view more details about your appointment.       This [employer] message has not been read.     Darcie, Batch Job User  Edwin Maher1 year ago     WallCompasshart, Processor  Edwin Maher1 year ago     PM  Appointment Information:      Visit Type: Follow Up Appointment          Date: 3/19/2024                  Dept: [facility] Vascular [place]                  Provider: Jose Elias Jones                  Time: 9:30 AM                  Length: 15 min     Appt Status: Scheduled

## 2025-02-04 ENCOUNTER — OFFICE VISIT (OUTPATIENT)
Age: 60
End: 2025-02-04
Payer: COMMERCIAL

## 2025-02-04 VITALS
HEIGHT: 71 IN | HEART RATE: 77 BPM | SYSTOLIC BLOOD PRESSURE: 136 MMHG | OXYGEN SATURATION: 96 % | DIASTOLIC BLOOD PRESSURE: 84 MMHG | BODY MASS INDEX: 33.32 KG/M2 | TEMPERATURE: 97.5 F | WEIGHT: 238 LBS

## 2025-02-04 DIAGNOSIS — B35.1 ONYCHOMYCOSIS: ICD-10-CM

## 2025-02-04 DIAGNOSIS — G62.89 OTHER POLYNEUROPATHY: ICD-10-CM

## 2025-02-04 DIAGNOSIS — K64.5 THROMBOSED EXTERNAL HEMORRHOID: ICD-10-CM

## 2025-02-04 DIAGNOSIS — Z13.220 SCREENING FOR LIPID DISORDERS: ICD-10-CM

## 2025-02-04 DIAGNOSIS — Z12.5 SCREENING FOR MALIGNANT NEOPLASM OF PROSTATE: ICD-10-CM

## 2025-02-04 DIAGNOSIS — L60.0 INGROWN TOENAIL OF RIGHT FOOT: ICD-10-CM

## 2025-02-04 DIAGNOSIS — Z00.00 ENCOUNTER FOR ROUTINE ADULT HEALTH EXAMINATION WITHOUT ABNORMAL FINDINGS: Primary | ICD-10-CM

## 2025-02-04 PROCEDURE — 99396 PREV VISIT EST AGE 40-64: CPT | Performed by: FAMILY MEDICINE

## 2025-02-04 RX ORDER — FLUCONAZOLE 150 MG/1
150 TABLET ORAL WEEKLY
Qty: 16 TABLET | Refills: 0 | Status: SHIPPED | OUTPATIENT
Start: 2025-02-04 | End: 2025-05-21

## 2025-02-04 RX ORDER — GABAPENTIN 300 MG/1
300 CAPSULE ORAL NIGHTLY
Qty: 30 CAPSULE | Refills: 0 | Status: SHIPPED | OUTPATIENT
Start: 2025-02-04 | End: 2025-03-06

## 2025-02-04 SDOH — ECONOMIC STABILITY: FOOD INSECURITY: WITHIN THE PAST 12 MONTHS, THE FOOD YOU BOUGHT JUST DIDN'T LAST AND YOU DIDN'T HAVE MONEY TO GET MORE.: NEVER TRUE

## 2025-02-04 SDOH — ECONOMIC STABILITY: FOOD INSECURITY: WITHIN THE PAST 12 MONTHS, YOU WORRIED THAT YOUR FOOD WOULD RUN OUT BEFORE YOU GOT MONEY TO BUY MORE.: NEVER TRUE

## 2025-02-04 ASSESSMENT — PATIENT HEALTH QUESTIONNAIRE - PHQ9
1. LITTLE INTEREST OR PLEASURE IN DOING THINGS: NOT AT ALL
SUM OF ALL RESPONSES TO PHQ9 QUESTIONS 1 & 2: 0
SUM OF ALL RESPONSES TO PHQ QUESTIONS 1-9: 0
2. FEELING DOWN, DEPRESSED OR HOPELESS: NOT AT ALL
SUM OF ALL RESPONSES TO PHQ QUESTIONS 1-9: 0

## 2025-02-04 NOTE — PROGRESS NOTES
membrane normal.      Left Ear: Tympanic membrane normal.      Nose: Nose normal.      Mouth/Throat:      Mouth: Mucous membranes are dry.   Eyes:      Extraocular Movements: Extraocular movements intact.      Conjunctiva/sclera: Conjunctivae normal.      Pupils: Pupils are equal, round, and reactive to light.   Cardiovascular:      Rate and Rhythm: Normal rate and regular rhythm.      Pulses: Normal pulses.      Heart sounds: Normal heart sounds.   Pulmonary:      Effort: Pulmonary effort is normal.      Breath sounds: Normal breath sounds.   Abdominal:      General: Abdomen is flat. Bowel sounds are normal.      Palpations: Abdomen is soft.   Musculoskeletal:         General: Normal range of motion.      Cervical back: Normal range of motion and neck supple.   Skin:     General: Skin is warm and dry.   Neurological:      General: No focal deficit present.      Mental Status: He is alert.   Psychiatric:         Mood and Affect: Mood normal.         Behavior: Behavior normal.         Thought Content: Thought content normal.         Judgment: Judgment normal.          Results         Assessment & Plan  1. Establishment of care.  A comprehensive set of laboratory tests has been ordered, including lipid panel, A1c, comprehensive metabolic panel, and PSA for prostate cancer screening. Thyroid function will also be assessed due to weight gain and neuropathy.    2. Neuropathy.  Gabapentin has been prescribed for neuropathy management, to be used as needed.    3. Ingrown toenail.  He has been advised to maintain cleanliness of the ingrown toenail. If the condition worsens or becomes painful, a referral to podiatry will be considered.    4. Hemorrhoid.  A referral to Dr. Montejo has been made for further evaluation and potential removal of the hemorrhoid.    5. Toenail fungus.  Fluconazole has been prescribed for the treatment of toenail fungus, to be taken once weekly for a duration of 16 weeks.  1. Encounter for routine

## 2025-02-25 ENCOUNTER — OFFICE VISIT (OUTPATIENT)
Dept: SURGERY | Age: 60
End: 2025-02-25
Payer: COMMERCIAL

## 2025-02-25 VITALS
TEMPERATURE: 97 F | OXYGEN SATURATION: 97 % | WEIGHT: 238 LBS | HEIGHT: 71 IN | BODY MASS INDEX: 33.32 KG/M2 | HEART RATE: 79 BPM

## 2025-02-25 DIAGNOSIS — K64.2 GRADE III HEMORRHOIDS: Primary | ICD-10-CM

## 2025-02-25 PROCEDURE — 46221 LIGATION OF HEMORRHOID(S): CPT | Performed by: COLON & RECTAL SURGERY

## 2025-02-25 ASSESSMENT — ENCOUNTER SYMPTOMS
CONSTIPATION: 0
COLOR CHANGE: 0
ABDOMINAL PAIN: 0
DIARRHEA: 0
RECTAL PAIN: 0
CHEST TIGHTNESS: 0
SHORTNESS OF BREATH: 0

## 2025-02-25 NOTE — PROGRESS NOTES
Subjective:      Patient ID: Edwin Maher is a 59 y.o. male who presents for:  Chief Complaint   Patient presents with    Hemorrhoids       This is a 59-year-old male who has some issues with prolapsing hemorrhoid disease.  I had seen him in the past regarding hemorrhoids    Minimal rectal bleeding.  Colonoscopy up-to-date.    Denies abdominal pain or unintentional weight loss        Past Medical History:   Diagnosis Date    Atypical chest pain, Dr Mcginnis, neg GXT, 2014 10/7/2015    COPD (chronic obstructive pulmonary disease) (HCC)     Dyslipidemia 3/16/2023    Emphysema of lung (HCC)     Health maintenance examination 10/7/2015    Left nephrolithiasis 10/7/2015    Lumbago 4/27/2004    Medial epicondylitis of left elbow, LONA Parikh 10/4/2018    Shortness of breath 10/7/2015     Past Surgical History:   Procedure Laterality Date    AMPUTATION Left     ring finger tip    [place] COLON CA SCRN NOT HI RSK IND N/A 11/1/2018    COLONOSCOPY performed by Nicole Louie MD at [facility]    WISDOM TOOTH EXTRACTION      x4     Social History     Socioeconomic History    Marital status: Single     Spouse name: Not on file    Number of children: Not on file    Years of education: Not on file    Highest education level: Not on file   Occupational History    Not on file   Tobacco Use    Smoking status: Never    Smokeless tobacco: Never   Vaping Use    Vaping status: Never Used   Substance and Sexual Activity    Alcohol use: No    Drug use: No    Sexual activity: Never   Other Topics Concern    Not on file   Social History Narrative    Not on file     Social Determinants of Health     Financial Resource Strain: Low Risk  (8/28/2023)    Overall Financial Resource Strain (CARDIA)     Difficulty of Paying Living Expenses: Not hard at all   Food Insecurity: No Food Insecurity (2/4/2025)    Hunger Vital Sign     Worried About Running Out of Food in the Last Year: Never true     Ran Out of Food in the Last Year: Never true

## 2025-03-07 DIAGNOSIS — Z00.00 ENCOUNTER FOR ROUTINE ADULT HEALTH EXAMINATION WITHOUT ABNORMAL FINDINGS: ICD-10-CM

## 2025-03-07 DIAGNOSIS — Z12.5 SCREENING FOR MALIGNANT NEOPLASM OF PROSTATE: ICD-10-CM

## 2025-03-07 DIAGNOSIS — Z13.220 SCREENING FOR LIPID DISORDERS: ICD-10-CM

## 2025-03-07 DIAGNOSIS — G62.89 OTHER POLYNEUROPATHY: ICD-10-CM

## 2025-03-07 LAB
ALBUMIN SERPL-MCNC: 4.5 G/DL (ref 3.5–4.6)
ALP SERPL-CCNC: 88 U/L (ref 35–104)
ALT SERPL-CCNC: 37 U/L (ref 0–41)
ANION GAP SERPL CALCULATED.3IONS-SCNC: 9 MEQ/L (ref 9–15)
AST SERPL-CCNC: 29 U/L (ref 0–40)
BASOPHILS # BLD: 0 K/UL (ref 0–0.2)
BASOPHILS NFR BLD: 0.3 %
BILIRUB SERPL-MCNC: 0.8 MG/DL (ref 0.2–0.7)
BUN SERPL-MCNC: 13 MG/DL (ref 6–20)
CALCIUM SERPL-MCNC: 9.2 MG/DL (ref 8.5–9.9)
CHLORIDE SERPL-SCNC: 105 MEQ/L (ref 95–107)
CHOLEST SERPL-MCNC: 201 MG/DL (ref 0–199)
CO2 SERPL-SCNC: 27 MEQ/L (ref 20–31)
CREAT SERPL-MCNC: 0.81 MG/DL (ref 0.7–1.2)
EOSINOPHIL # BLD: 0.1 K/UL (ref 0–0.7)
EOSINOPHIL NFR BLD: 1.6 %
ERYTHROCYTE [DISTWIDTH] IN BLOOD BY AUTOMATED COUNT: 12.5 % (ref 11.5–14.5)
GLOBULIN SER CALC-MCNC: 2.4 G/DL (ref 2.3–3.5)
GLUCOSE SERPL-MCNC: 90 MG/DL (ref 70–99)
HCT VFR BLD AUTO: 46.7 % (ref 42–52)
HDLC SERPL-MCNC: 31 MG/DL (ref 40–59)
HGB BLD-MCNC: 15.8 G/DL (ref 14–18)
LDL CHOLESTEROL: 129 MG/DL (ref 0–129)
LYMPHOCYTES # BLD: 1.5 K/UL (ref 1–4.8)
LYMPHOCYTES NFR BLD: 21 %
MCH RBC QN AUTO: 31.6 PG (ref 27–31.3)
MCHC RBC AUTO-ENTMCNC: 33.8 % (ref 33–37)
MCV RBC AUTO: 93.4 FL (ref 79–92.2)
MONOCYTES # BLD: 0.6 K/UL (ref 0.2–0.8)
MONOCYTES NFR BLD: 8.3 %
NEUTROPHILS # BLD: 4.8 K/UL (ref 1.4–6.5)
NEUTS SEG NFR BLD: 68.5 %
PLATELET # BLD AUTO: 151 K/UL (ref 130–400)
POTASSIUM SERPL-SCNC: 3.9 MEQ/L (ref 3.4–4.9)
PROT SERPL-MCNC: 6.9 G/DL (ref 6.3–8)
RBC # BLD AUTO: 5 M/UL (ref 4.7–6.1)
SODIUM SERPL-SCNC: 141 MEQ/L (ref 135–144)
TRIGLYCERIDE, FASTING: 206 MG/DL (ref 0–150)
TSH REFLEX: 1.45 UIU/ML (ref 0.44–3.86)
WBC # BLD AUTO: 7 K/UL (ref 4.8–10.8)

## 2025-03-08 LAB
ESTIMATED AVERAGE GLUCOSE: 88 MG/DL
HBA1C MFR BLD: 4.7 % (ref 4–6)
PROSTATE SPECIFIC ANTIGEN: 0.36 NG/ML (ref 0–4)
PSA FREE MFR SERPL: 11.1 %
PSA FREE SERPL-MCNC: <0.1 UG/L

## 2025-03-12 ENCOUNTER — RESULTS FOLLOW-UP (OUTPATIENT)
Age: 60
End: 2025-03-12

## 2025-03-20 ENCOUNTER — PATIENT MESSAGE (OUTPATIENT)
Age: 60
End: 2025-03-20

## 2025-03-20 DIAGNOSIS — L60.0 INGROWN NAIL OF GREAT TOE: Primary | ICD-10-CM

## 2025-04-25 ENCOUNTER — HOSPITAL ENCOUNTER (OUTPATIENT)
Dept: CT IMAGING | Age: 60
Discharge: HOME | End: 2025-04-27
Payer: COMMERCIAL

## 2025-04-25 ENCOUNTER — TELEPHONE (OUTPATIENT)
Age: 60
End: 2025-04-25

## 2025-04-25 ENCOUNTER — RESULTS FOLLOW-UP (OUTPATIENT)
Age: 60
End: 2025-04-25

## 2025-04-25 ENCOUNTER — OFFICE VISIT (OUTPATIENT)
Age: 60
End: 2025-04-25
Payer: COMMERCIAL

## 2025-04-25 VITALS
SYSTOLIC BLOOD PRESSURE: 132 MMHG | HEIGHT: 71 IN | TEMPERATURE: 97 F | BODY MASS INDEX: 32.9 KG/M2 | DIASTOLIC BLOOD PRESSURE: 76 MMHG | HEART RATE: 78 BPM | WEIGHT: 235 LBS | OXYGEN SATURATION: 97 %

## 2025-04-25 DIAGNOSIS — R10.31 RIGHT LOWER QUADRANT ABDOMINAL PAIN: Primary | ICD-10-CM

## 2025-04-25 DIAGNOSIS — R10.31 RIGHT LOWER QUADRANT ABDOMINAL PAIN: ICD-10-CM

## 2025-04-25 DIAGNOSIS — R10.9 RIGHT FLANK PAIN: ICD-10-CM

## 2025-04-25 LAB
BILIRUBIN, POC: NORMAL
BLOOD URINE, POC: NORMAL
CLARITY, POC: CLEAR
COLOR, POC: YELLOW
GLUCOSE URINE, POC: NORMAL MG/DL
KETONES, POC: NORMAL MG/DL
LEUKOCYTE EST, POC: NORMAL
NITRITE, POC: NORMAL
PH, POC: 6.5
PROTEIN, POC: NORMAL MG/DL
SPECIFIC GRAVITY, POC: 1.02
UROBILINOGEN, POC: 0.1 MG/DL

## 2025-04-25 PROCEDURE — 74176 CT ABD & PELVIS W/O CONTRAST: CPT

## 2025-04-25 PROCEDURE — 81003 URINALYSIS AUTO W/O SCOPE: CPT | Performed by: NURSE PRACTITIONER

## 2025-04-25 PROCEDURE — 99213 OFFICE O/P EST LOW 20 MIN: CPT | Performed by: NURSE PRACTITIONER

## 2025-04-25 NOTE — RESULT ENCOUNTER NOTE
CT of the abdomen showing fatty liver disease, diverticulosis w/o diverticulitis, no kidney stones.   Everything else normal.  Pain may be more musculoskeletal.   Recommend seeing GI as well if patient has not had screening colonoscopy.

## 2025-04-25 NOTE — TELEPHONE ENCOUNTER
Spoke with pt - Patient expressed understanding. Patient was inquiring about CT results - read to patient. Patient not interested in seeing GI referral or follow up appointment at this time.

## 2025-04-25 NOTE — TELEPHONE ENCOUNTER
Patient is asking why he is having labs repeated today that he just had done on 3/7/25?   Labs and CT were ordered today     He is at the lab waiting to hear from us.  [phone]

## 2025-04-25 NOTE — PROGRESS NOTES
Subjective:      Patient ID: Edwin Maher is a 59 y.o. male who presents today for:  Chief Complaint   Patient presents with    Abdominal Pain     Pt states flank pain, inguinal pain overall 2 months.        HPI  Patient is here with c/o right flank pain.   Off and on for months. Groin pain about a week ago.   Says he thought is was his back or hips.   Now has a pain to the right lower abdomen and groin area.   Says he has no urinary sx.   Denies any blood in the urine.   Says he has no issues with BM.  Says he is eating and drinking normally.   Denies any fever or chills.   Denies any injury to the area.     Past Medical History:   Diagnosis Date    Atypical chest pain, Dr Mcginnis, neg GXT, 2014 10/7/2015    COPD (chronic obstructive pulmonary disease) ([facility]     Dyslipidemia 3/16/2023    Emphysema of lung ([facility])     Health maintenance examination 10/7/2015    Left nephrolithiasis 10/7/2015    Lumbago 4/27/2004    Medial epicondylitis of left elbow, LONA Parikh 10/4/2018    Shortness of breath 10/7/2015     Past Surgical History:   Procedure Laterality Date    AMPUTATION Left     ring finger tip    [place] COLON CA SCRN NOT HI RSK IND N/A 11/1/2018    COLONOSCOPY performed by Nicole Louie MD at [facility]    WISDOM TOOTH EXTRACTION      x4     Social History     Socioeconomic History    Marital status: Single     Spouse name: Not on file    Number of children: Not on file    Years of education: Not on file    Highest education level: Not on file   Occupational History    Not on file   Tobacco Use    Smoking status: Never    Smokeless tobacco: Never   Vaping Use    Vaping status: Never Used   Substance and Sexual Activity    Alcohol use: No    Drug use: No    Sexual activity: Never   Other Topics Concern    Not on file   Social History Narrative    Not on file     Social Drivers of Health     Financial Resource Strain: Low Risk  (8/28/2023)    Overall Financial Resource Strain (CARDIA)     Difficulty of

## 2025-04-30 ASSESSMENT — ENCOUNTER SYMPTOMS
ABDOMINAL PAIN: 1
ABDOMINAL DISTENTION: 0
NAUSEA: 0
VOMITING: 0
DIARRHEA: 0

## 2025-07-22 ENCOUNTER — APPOINTMENT (OUTPATIENT)
Dept: PRIMARY CARE | Facility: CLINIC | Age: 60
End: 2025-07-22
Payer: COMMERCIAL

## 2025-07-22 VITALS
SYSTOLIC BLOOD PRESSURE: 134 MMHG | WEIGHT: 230 LBS | DIASTOLIC BLOOD PRESSURE: 82 MMHG | HEART RATE: 76 BPM | HEIGHT: 71 IN | OXYGEN SATURATION: 99 % | BODY MASS INDEX: 32.2 KG/M2 | TEMPERATURE: 97.5 F

## 2025-07-22 DIAGNOSIS — K76.0 HEPATIC STEATOSIS: ICD-10-CM

## 2025-07-22 DIAGNOSIS — M62.830 LUMBAR PARASPINAL MUSCLE SPASM: Primary | ICD-10-CM

## 2025-07-22 DIAGNOSIS — M54.9 BACK PAIN, UNSPECIFIED BACK LOCATION, UNSPECIFIED BACK PAIN LATERALITY, UNSPECIFIED CHRONICITY: ICD-10-CM

## 2025-07-22 PROCEDURE — 99214 OFFICE O/P EST MOD 30 MIN: CPT | Performed by: FAMILY MEDICINE

## 2025-07-22 RX ORDER — PREDNISONE 10 MG/1
TABLET ORAL
Qty: 20 TABLET | Refills: 0 | Status: SHIPPED | OUTPATIENT
Start: 2025-07-22

## 2025-07-22 RX ORDER — METHOCARBAMOL 500 MG/1
500 TABLET, FILM COATED ORAL NIGHTLY
Qty: 40 TABLET | Refills: 1 | Status: SHIPPED | OUTPATIENT
Start: 2025-07-22 | End: 2025-10-10

## 2025-07-22 ASSESSMENT — ENCOUNTER SYMPTOMS
HEADACHES: 0
COLOR CHANGE: 0
EYE PAIN: 0
ARTHRALGIAS: 0
PHOTOPHOBIA: 0
SHORTNESS OF BREATH: 0
CONSTIPATION: 0
ABDOMINAL PAIN: 1
DIZZINESS: 0
RECTAL PAIN: 0
FATIGUE: 0
FLANK PAIN: 0
MYALGIAS: 0
APPETITE CHANGE: 0
AGITATION: 0
PALPITATIONS: 0
POLYPHAGIA: 0
BLOOD IN STOOL: 0
HEMATURIA: 0
DYSURIA: 0
CONSTITUTIONAL NEGATIVE: 1
COUGH: 0
RHINORRHEA: 0
POLYDIPSIA: 0
CHEST TIGHTNESS: 0
ADENOPATHY: 0
ACTIVITY CHANGE: 0
ABDOMINAL DISTENTION: 0
NERVOUS/ANXIOUS: 0
DECREASED CONCENTRATION: 0
SEIZURES: 0
SINUS PAIN: 0
SORE THROAT: 0
CONFUSION: 0
FEVER: 0
SLEEP DISTURBANCE: 0
DIARRHEA: 0
TROUBLE SWALLOWING: 0
DYSPHORIC MOOD: 0
STRIDOR: 0
SPEECH DIFFICULTY: 0
SINUS PRESSURE: 0
NECK STIFFNESS: 0

## 2025-07-22 NOTE — PROGRESS NOTES
"Subjective   Patient ID: Matt Estrada is a 59 y.o. male who presents for Abdominal Pain (Left flank pain for about 2 months; seen at [facility] and had xrays;  doing PT).    Abdominal Pain  Pertinent negatives include no arthralgias, constipation, diarrhea, dysuria, fever, headaches, hematuria or myalgias.      The patient is in office for left flank pain. Persistent and nagging but reports that it is not all day.     Review of Systems   Constitutional: Negative.  Negative for activity change, appetite change, fatigue and fever.   HENT:  Negative for congestion, dental problem, ear discharge, ear pain, mouth sores, rhinorrhea, sinus pressure, sinus pain, sore throat, tinnitus and trouble swallowing.    Eyes:  Negative for photophobia, pain and visual disturbance.   Respiratory:  Negative for cough, chest tightness, shortness of breath and stridor.    Cardiovascular:  Negative for chest pain and palpitations.   Gastrointestinal:  Positive for abdominal pain. Negative for abdominal distention, blood in stool, constipation, diarrhea and rectal pain.   Endocrine: Negative for cold intolerance, heat intolerance, polydipsia, polyphagia and polyuria.   Genitourinary:  Negative for dysuria, flank pain, hematuria and urgency.   Musculoskeletal:  Negative for arthralgias, gait problem, myalgias and neck stiffness.   Skin:  Negative for color change and rash.   Allergic/Immunologic: Negative for environmental allergies and food allergies.   Neurological:  Negative for dizziness, seizures, syncope, speech difficulty and headaches.   Hematological:  Negative for adenopathy.   Psychiatric/Behavioral:  Negative for agitation, confusion, decreased concentration, dysphoric mood and sleep disturbance. The patient is not nervous/anxious.        Objective   /82   Pulse 76   Temp 36.4 °C (97.5 °F) (Temporal)   Ht 1.803 m (5' 11\")   Wt 104 kg (230 lb)   SpO2 99%   BMI 32.08 kg/m²     Physical Exam  Vitals reviewed. "   Constitutional:       General: He is not in acute distress.     Appearance: Normal appearance. He is normal weight. He is not ill-appearing or diaphoretic.   HENT:      Head: Normocephalic.      Right Ear: Tympanic membrane and external ear normal.      Left Ear: Tympanic membrane and external ear normal.      Nose: Nose normal. No congestion.      Mouth/Throat:      Pharynx: No posterior oropharyngeal erythema.     Eyes:      General:         Right eye: No discharge.         Left eye: No discharge.      Extraocular Movements: Extraocular movements intact.      Conjunctiva/sclera: Conjunctivae normal.      Pupils: Pupils are equal, round, and reactive to light.       Cardiovascular:      Rate and Rhythm: Normal rate and regular rhythm.      Pulses: Normal pulses.      Heart sounds: Normal heart sounds. No murmur heard.  Pulmonary:      Effort: Pulmonary effort is normal. No respiratory distress.      Breath sounds: Normal breath sounds. No wheezing or rales.   Chest:      Chest wall: No tenderness.   Abdominal:      General: Abdomen is flat. Bowel sounds are normal. There is no distension.      Palpations: There is no mass.      Tenderness: There is no abdominal tenderness. There is no guarding.     Musculoskeletal:         General: No tenderness. Normal range of motion.      Cervical back: Normal range of motion and neck supple. No tenderness.      Right lower leg: No edema.      Left lower leg: No edema.     Skin:     General: Skin is dry.      Coloration: Skin is not jaundiced.      Findings: No bruising, erythema or rash.     Neurological:      General: No focal deficit present.      Mental Status: He is alert and oriented to person, place, and time. Mental status is at baseline.      Cranial Nerves: No cranial nerve deficit.      Sensory: No sensory deficit.      Coordination: Coordination normal.      Gait: Gait normal.     Psychiatric:         Mood and Affect: Mood normal.         Thought Content: Thought  content normal.         Judgment: Judgment normal.         Assessment/Plan   Problem List Items Addressed This Visit           ICD-10-CM    Hepatic steatosis K76.0    Lumbar paraspinal muscle spasm - Primary M62.830     Other Visit Diagnoses         Codes      Back pain, unspecified back location, unspecified back pain laterality, unspecified chronicity     M54.9    Relevant Medications    predniSONE (Deltasone) 10 mg tablet    methocarbamol (Robaxin) 500 mg tablet        Start prednisolone     Start Robaxin 500 mg at bed time    Scribe Attestation  By signing my name below, I, KIMMY Pinzon , Darrell   attest that this documentation has been prepared under the direction and in the presence of José Migule Musa DO.     All medical record entries made by the Scribe were at my direction and personally dictated by me. I have reviewed the chart and agree that the record accurately reflects my personal performance of the history, physical exam, discussion and plan.

## 2025-08-19 ENCOUNTER — OFFICE VISIT (OUTPATIENT)
Dept: PRIMARY CARE | Facility: CLINIC | Age: 60
End: 2025-08-19
Payer: COMMERCIAL

## 2025-08-19 VITALS
WEIGHT: 235 LBS | RESPIRATION RATE: 16 BRPM | HEIGHT: 71 IN | TEMPERATURE: 97 F | HEART RATE: 68 BPM | BODY MASS INDEX: 32.9 KG/M2 | OXYGEN SATURATION: 96 % | SYSTOLIC BLOOD PRESSURE: 122 MMHG | DIASTOLIC BLOOD PRESSURE: 70 MMHG

## 2025-08-19 DIAGNOSIS — S76.011D STRAIN OF RIGHT PSOAS MUSCLE, SUBSEQUENT ENCOUNTER: ICD-10-CM

## 2025-08-19 DIAGNOSIS — M62.830 LUMBAR PARASPINAL MUSCLE SPASM: Primary | ICD-10-CM

## 2025-08-19 PROBLEM — S76.011A STRAIN OF RIGHT PSOAS MUSCLE: Status: ACTIVE | Noted: 2025-08-19

## 2025-08-19 PROCEDURE — 99214 OFFICE O/P EST MOD 30 MIN: CPT | Performed by: FAMILY MEDICINE

## 2025-08-19 RX ORDER — METAXALONE 800 MG/1
800 TABLET ORAL 2 TIMES DAILY
Qty: 180 TABLET | Refills: 1 | Status: SHIPPED | OUTPATIENT
Start: 2025-08-19 | End: 2026-02-15

## 2025-08-19 ASSESSMENT — ENCOUNTER SYMPTOMS
RHINORRHEA: 0
DECREASED CONCENTRATION: 0
DYSPHORIC MOOD: 0
SEIZURES: 0
SLEEP DISTURBANCE: 0
PALPITATIONS: 0
MYALGIAS: 0
DIARRHEA: 0
ABDOMINAL PAIN: 0
SORE THROAT: 0
CHEST TIGHTNESS: 0
CONSTIPATION: 0
STRIDOR: 0
PHOTOPHOBIA: 0
FATIGUE: 0
HEMATURIA: 0
APPETITE CHANGE: 0
BLOOD IN STOOL: 0
COUGH: 0
BACK PAIN: 1
AGITATION: 0
TROUBLE SWALLOWING: 0
FEVER: 0
RECTAL PAIN: 0
FLANK PAIN: 0
NECK STIFFNESS: 0
DYSURIA: 0
SINUS PAIN: 0
ACTIVITY CHANGE: 0
DEPRESSION: 0
POLYDIPSIA: 0
ADENOPATHY: 0
SHORTNESS OF BREATH: 0
COLOR CHANGE: 0
DIZZINESS: 0
HEADACHES: 0
POLYPHAGIA: 0
SINUS PRESSURE: 0
CONSTITUTIONAL NEGATIVE: 1
NERVOUS/ANXIOUS: 0
ARTHRALGIAS: 0
ABDOMINAL DISTENTION: 0
SPEECH DIFFICULTY: 0
EYE PAIN: 0
CONFUSION: 0

## 2025-08-19 ASSESSMENT — PATIENT HEALTH QUESTIONNAIRE - PHQ9
SUM OF ALL RESPONSES TO PHQ9 QUESTIONS 1 AND 2: 0
2. FEELING DOWN, DEPRESSED OR HOPELESS: NOT AT ALL
1. LITTLE INTEREST OR PLEASURE IN DOING THINGS: NOT AT ALL

## 2025-08-20 ENCOUNTER — APPOINTMENT (OUTPATIENT)
Dept: PRIMARY CARE | Facility: CLINIC | Age: 60
End: 2025-08-20
Payer: COMMERCIAL